# Patient Record
Sex: MALE | Race: OTHER | URBAN - METROPOLITAN AREA
[De-identification: names, ages, dates, MRNs, and addresses within clinical notes are randomized per-mention and may not be internally consistent; named-entity substitution may affect disease eponyms.]

---

## 2018-08-27 ENCOUNTER — INPATIENT (INPATIENT)
Facility: HOSPITAL | Age: 43
LOS: 3 days | Discharge: HOME | DRG: 274 | End: 2018-08-31
Attending: INTERNAL MEDICINE | Admitting: INTERNAL MEDICINE
Payer: COMMERCIAL

## 2018-08-27 VITALS
SYSTOLIC BLOOD PRESSURE: 134 MMHG | WEIGHT: 179.9 LBS | TEMPERATURE: 98 F | HEART RATE: 62 BPM | DIASTOLIC BLOOD PRESSURE: 85 MMHG | HEIGHT: 68 IN | OXYGEN SATURATION: 99 % | RESPIRATION RATE: 18 BRPM

## 2018-08-27 DIAGNOSIS — R00.2 PALPITATIONS: ICD-10-CM

## 2018-08-27 DIAGNOSIS — E78.5 HYPERLIPIDEMIA, UNSPECIFIED: ICD-10-CM

## 2018-08-27 LAB
ALBUMIN SERPL ELPH-MCNC: 5 G/DL — SIGNIFICANT CHANGE UP (ref 3.3–5)
ALP SERPL-CCNC: 61 U/L — SIGNIFICANT CHANGE UP (ref 40–120)
ALT FLD-CCNC: 29 U/L — SIGNIFICANT CHANGE UP (ref 10–45)
ANION GAP SERPL CALC-SCNC: 10 MMOL/L — SIGNIFICANT CHANGE UP (ref 5–17)
APTT BLD: 30 SEC — SIGNIFICANT CHANGE UP (ref 27.5–37.4)
AST SERPL-CCNC: 21 U/L — SIGNIFICANT CHANGE UP (ref 10–40)
BASOPHILS # BLD AUTO: 0.1 K/UL — SIGNIFICANT CHANGE UP (ref 0–0.2)
BASOPHILS NFR BLD AUTO: 1 % — SIGNIFICANT CHANGE UP (ref 0–2)
BILIRUB SERPL-MCNC: 0.8 MG/DL — SIGNIFICANT CHANGE UP (ref 0.2–1.2)
BUN SERPL-MCNC: 14 MG/DL — SIGNIFICANT CHANGE UP (ref 7–23)
CALCIUM SERPL-MCNC: 9.6 MG/DL — SIGNIFICANT CHANGE UP (ref 8.4–10.5)
CHLORIDE SERPL-SCNC: 98 MMOL/L — SIGNIFICANT CHANGE UP (ref 96–108)
CK MB CFR SERPL CALC: 1 NG/ML — SIGNIFICANT CHANGE UP (ref 0–6.7)
CK SERPL-CCNC: 87 U/L — SIGNIFICANT CHANGE UP (ref 30–200)
CK SERPL-CCNC: 91 U/L — SIGNIFICANT CHANGE UP (ref 30–200)
CO2 SERPL-SCNC: 27 MMOL/L — SIGNIFICANT CHANGE UP (ref 22–31)
CREAT SERPL-MCNC: 1.04 MG/DL — SIGNIFICANT CHANGE UP (ref 0.5–1.3)
EOSINOPHIL # BLD AUTO: 0.1 K/UL — SIGNIFICANT CHANGE UP (ref 0–0.5)
EOSINOPHIL NFR BLD AUTO: 1.9 % — SIGNIFICANT CHANGE UP (ref 0–6)
GLUCOSE SERPL-MCNC: 114 MG/DL — HIGH (ref 70–99)
HCT VFR BLD CALC: 50.7 % — HIGH (ref 39–50)
HGB BLD-MCNC: 17.1 G/DL — HIGH (ref 13–17)
INR BLD: 1.03 RATIO — SIGNIFICANT CHANGE UP (ref 0.88–1.16)
LYMPHOCYTES # BLD AUTO: 1.9 K/UL — SIGNIFICANT CHANGE UP (ref 1–3.3)
LYMPHOCYTES # BLD AUTO: 33.4 % — SIGNIFICANT CHANGE UP (ref 13–44)
MAGNESIUM SERPL-MCNC: 2.3 MG/DL — SIGNIFICANT CHANGE UP (ref 1.6–2.6)
MCHC RBC-ENTMCNC: 31.4 PG — SIGNIFICANT CHANGE UP (ref 27–34)
MCHC RBC-ENTMCNC: 33.8 GM/DL — SIGNIFICANT CHANGE UP (ref 32–36)
MCV RBC AUTO: 92.9 FL — SIGNIFICANT CHANGE UP (ref 80–100)
MONOCYTES # BLD AUTO: 0.4 K/UL — SIGNIFICANT CHANGE UP (ref 0–0.9)
MONOCYTES NFR BLD AUTO: 7.4 % — SIGNIFICANT CHANGE UP (ref 2–14)
NEUTROPHILS # BLD AUTO: 3.2 K/UL — SIGNIFICANT CHANGE UP (ref 1.8–7.4)
NEUTROPHILS NFR BLD AUTO: 56.4 % — SIGNIFICANT CHANGE UP (ref 43–77)
PHOSPHATE SERPL-MCNC: 2.2 MG/DL — LOW (ref 2.5–4.5)
PLATELET # BLD AUTO: 232 K/UL — SIGNIFICANT CHANGE UP (ref 150–400)
POTASSIUM SERPL-MCNC: 4.5 MMOL/L — SIGNIFICANT CHANGE UP (ref 3.5–5.3)
POTASSIUM SERPL-SCNC: 4.5 MMOL/L — SIGNIFICANT CHANGE UP (ref 3.5–5.3)
PROT SERPL-MCNC: 7.8 G/DL — SIGNIFICANT CHANGE UP (ref 6–8.3)
PROTHROM AB SERPL-ACNC: 11.1 SEC — SIGNIFICANT CHANGE UP (ref 9.8–12.7)
RBC # BLD: 5.46 M/UL — SIGNIFICANT CHANGE UP (ref 4.2–5.8)
RBC # FLD: 12.7 % — SIGNIFICANT CHANGE UP (ref 10.3–14.5)
SODIUM SERPL-SCNC: 135 MMOL/L — SIGNIFICANT CHANGE UP (ref 135–145)
TROPONIN T, HIGH SENSITIVITY RESULT: <6 NG/L — SIGNIFICANT CHANGE UP (ref 0–51)
WBC # BLD: 5.6 K/UL — SIGNIFICANT CHANGE UP (ref 3.8–10.5)
WBC # FLD AUTO: 5.6 K/UL — SIGNIFICANT CHANGE UP (ref 3.8–10.5)

## 2018-08-27 PROCEDURE — 99285 EMERGENCY DEPT VISIT HI MDM: CPT | Mod: 25

## 2018-08-27 PROCEDURE — 71046 X-RAY EXAM CHEST 2 VIEWS: CPT | Mod: 26

## 2018-08-27 PROCEDURE — 93010 ELECTROCARDIOGRAM REPORT: CPT | Mod: 59

## 2018-08-27 RX ORDER — TRIAMCINOLONE ACETONIDE 55 MCG
0 AEROSOL, SPRAY (GRAM) NASAL
Qty: 0 | Refills: 0 | COMMUNITY

## 2018-08-27 RX ORDER — ATORVASTATIN CALCIUM 80 MG/1
40 TABLET, FILM COATED ORAL AT BEDTIME
Qty: 0 | Refills: 0 | Status: DISCONTINUED | OUTPATIENT
Start: 2018-08-27 | End: 2018-08-31

## 2018-08-27 RX ORDER — SODIUM,POTASSIUM PHOSPHATES 278-250MG
1 POWDER IN PACKET (EA) ORAL
Qty: 0 | Refills: 0 | Status: COMPLETED | OUTPATIENT
Start: 2018-08-27 | End: 2018-08-28

## 2018-08-27 RX ORDER — MAGNESIUM SULFATE 500 MG/ML
1 VIAL (ML) INJECTION ONCE
Qty: 0 | Refills: 0 | Status: COMPLETED | OUTPATIENT
Start: 2018-08-27 | End: 2018-08-27

## 2018-08-27 RX ORDER — ENOXAPARIN SODIUM 100 MG/ML
40 INJECTION SUBCUTANEOUS EVERY 24 HOURS
Qty: 0 | Refills: 0 | Status: DISCONTINUED | OUTPATIENT
Start: 2018-08-27 | End: 2018-08-31

## 2018-08-27 RX ORDER — ONDANSETRON 8 MG/1
4 TABLET, FILM COATED ORAL EVERY 6 HOURS
Qty: 0 | Refills: 0 | Status: DISCONTINUED | OUTPATIENT
Start: 2018-08-27 | End: 2018-08-31

## 2018-08-27 RX ORDER — METOPROLOL TARTRATE 50 MG
25 TABLET ORAL DAILY
Qty: 0 | Refills: 0 | Status: DISCONTINUED | OUTPATIENT
Start: 2018-08-27 | End: 2018-08-31

## 2018-08-27 RX ADMIN — Medication 1 TABLET(S): at 22:12

## 2018-08-27 RX ADMIN — Medication 1 GRAM(S): at 13:30

## 2018-08-27 RX ADMIN — Medication 25 MILLIGRAM(S): at 22:12

## 2018-08-27 RX ADMIN — Medication 100 GRAM(S): at 12:30

## 2018-08-27 RX ADMIN — ATORVASTATIN CALCIUM 40 MILLIGRAM(S): 80 TABLET, FILM COATED ORAL at 22:12

## 2018-08-27 NOTE — H&P ADULT - NSHPLABSRESULTS_GEN_ALL_CORE
Lab Results:  CBC  CBC Full  -  ( 27 Aug 2018 11:41 )  WBC Count : 5.6 K/uL  Hemoglobin : 17.1 g/dL  Hematocrit : 50.7 %  Platelet Count - Automated : 232 K/uL  Mean Cell Volume : 92.9 fl  Mean Cell Hemoglobin : 31.4 pg  Mean Cell Hemoglobin Concentration : 33.8 gm/dL  Auto Neutrophil # : 3.2 K/uL  Auto Lymphocyte # : 1.9 K/uL  Auto Monocyte # : 0.4 K/uL  Auto Eosinophil # : 0.1 K/uL  Auto Basophil # : 0.1 K/uL  Auto Neutrophil % : 56.4 %  Auto Lymphocyte % : 33.4 %  Auto Monocyte % : 7.4 %  Auto Eosinophil % : 1.9 %  Auto Basophil % : 1.0 %    .		Differential:	[] Automated		[] Manual  Chemistry                        17.1   5.6   )-----------( 232      ( 27 Aug 2018 11:41 )             50.7     08-27    135  |  98  |  14  ----------------------------<  114<H>  4.5   |  27  |  1.04    Ca    9.6      27 Aug 2018 11:41  Phos  2.5     08-27  Mg     1.9     08-27    TPro  7.8  /  Alb  5.0  /  TBili  0.8  /  DBili  x   /  AST  21  /  ALT  29  /  AlkPhos  61  08-27    LIVER FUNCTIONS - ( 27 Aug 2018 11:41 )  Alb: 5.0 g/dL / Pro: 7.8 g/dL / ALK PHOS: 61 U/L / ALT: 29 U/L / AST: 21 U/L / GGT: x           PT/INR - ( 27 Aug 2018 11:41 )   PT: 11.1 sec;   INR: 1.03 ratio         PTT - ( 27 Aug 2018 11:41 )  PTT:30.0 sec          MICROBIOLOGY/CULTURES:      RADIOLOGY RESULTS: reviewed

## 2018-08-27 NOTE — CONSULT NOTE ADULT - SUBJECTIVE AND OBJECTIVE BOX
Patient seen and evaluated at bedside    Chief Complaint:    HPI:      PMH:   Hyperlipidemia, unspecified hyperlipidemia type      PSH:   No significant past surgical history      Medications:       Allergies:  No Known Allergies      FAMILY HISTORY:      Social History:  Smoking History:  Alcohol Use:  Drug Use:    Review of Systems:  REVIEW OF SYSTEMS:  CONSTITUTIONAL: No weakness, fevers or chills  EYES/ENT: No visual changes;  No dysphagia  NECK: No pain or stiffness  RESPIRATORY: No cough, wheezing, hemoptysis; No shortness of breath  CARDIOVASCULAR: No chest pain or palpitations; No lower extremity edema  GASTROINTESTINAL: No abdominal or epigastric pain. No nausea, vomiting, or hematemesis; No diarrhea or constipation. No melena or hematochezia.  BACK: No back pain  GENITOURINARY: No dysuria, frequency or hematuria  NEUROLOGICAL: No numbness or weakness  SKIN: No itching, burning, rashes, or lesions   All other review of systems is negative unless indicated above.    Physical Exam:  T(F): 98 (08-27), Max: 98 (08-27)  HR: 60 (08-27) (57 - 62)  BP: 126/82 (08-27) (121/87 - 134/85)  RR: 16 (08-27)  SpO2: 100% (08-27)      GENERAL: No acute distress, well-developed  HEAD:  Atraumatic, Normocephalic  ENT: EOMI, PERRLA, conjunctiva and sclera clear, Neck supple, No JVD, moist mucosa  CHEST/LUNG: Clear to auscultation bilaterally; No wheeze, equal breath sounds bilaterally   BACK: No spinal tenderness  HEART: Regular rate and rhythm; No murmurs, rubs, or gallops  ABDOMEN: Soft, Nontender, Nondistended; Bowel sounds present  EXTREMITIES:  No clubbing, cyanosis, or edema  PSYCH: Nl behavior, nl affect  NEUROLOGY: AAOx3, non-focal, cranial nerves intact  SKIN: Normal color, No rashes or lesions  LINES:    Cardiovascular Diagnostic Testing:    ECG: Personally reviewed    Echo:    Stress Testing:    Cath:    Interpretation of Telemetry:    Imaging:    Labs: Personally reviewed                        17.1   5.6   )-----------( 232      ( 27 Aug 2018 11:41 )             50.7     08-27    135  |  98  |  14  ----------------------------<  114<H>  4.5   |  27  |  1.04    Ca    9.6      27 Aug 2018 11:41  Phos  2.5     08-27  Mg     1.9     08-27    TPro  7.8  /  Alb  5.0  /  TBili  0.8  /  DBili  x   /  AST  21  /  ALT  29  /  AlkPhos  61  08-27    PT/INR - ( 27 Aug 2018 11:41 )   PT: 11.1 sec;   INR: 1.03 ratio         PTT - ( 27 Aug 2018 11:41 )  PTT:30.0 sec Chief Complaint: Chest Pain    HPI:  43 year old male with HLD presenting to ED with at recommendation of his cardiologist Dr. Ed Riley for NSVT seen on holter monitor. Approx. two weeks ago patient reports intermittent fluttering sensation in chest associated with left sided chest pain that radiates to the right and down to stomach. No SOB, nausea/vomiting/abdominal pain, lightheadedness dizziness. On Monday, patient saw his cardiologist who placed him on 24hr. holter monitor and was prescribed metoprolol 25mg daily. No symptoms at this current time.     Reports stressful job and increased stress at home due to pt's ishaan's mother moving in 3 months ago.       PMH:   Hyperlipidemia    PSH:   No significant past surgical history    Medications:       Allergies:  No Known Allergies      FAMILY HISTORY:      Social History:  Smoking History:  Alcohol Use:  Drug Use:    Review of Systems:  REVIEW OF SYSTEMS:  CONSTITUTIONAL: No weakness, fevers or chills  EYES/ENT: No visual changes;  No dysphagia  NECK: No pain or stiffness  RESPIRATORY: No cough, wheezing, hemoptysis; No shortness of breath  CARDIOVASCULAR: No chest pain or palpitations; No lower extremity edema  GASTROINTESTINAL: No abdominal or epigastric pain. No nausea, vomiting, or hematemesis; No diarrhea or constipation. No melena or hematochezia.  BACK: No back pain  GENITOURINARY: No dysuria, frequency or hematuria  NEUROLOGICAL: No numbness or weakness  SKIN: No itching, burning, rashes, or lesions   All other review of systems is negative unless indicated above.    Physical Exam:  T(F): 98 (08-27), Max: 98 (08-27)  HR: 60 (08-27) (57 - 62)  BP: 126/82 (08-27) (121/87 - 134/85)  RR: 16 (08-27)  SpO2: 100% (08-27)      GENERAL: No acute distress, well-developed  HEAD:  Atraumatic, Normocephalic  ENT: EOMI, PERRLA, conjunctiva and sclera clear, Neck supple, No JVD, moist mucosa  CHEST/LUNG: Clear to auscultation bilaterally; No wheeze, equal breath sounds bilaterally   BACK: No spinal tenderness  HEART: Regular rate and rhythm; No murmurs, rubs, or gallops  ABDOMEN: Soft, Nontender, Nondistended; Bowel sounds present  EXTREMITIES:  No clubbing, cyanosis, or edema  PSYCH: Nl behavior, nl affect  NEUROLOGY: AAOx3, non-focal, cranial nerves intact  SKIN: Normal color, No rashes or lesions  LINES:    Cardiovascular Diagnostic Testing:    ECG: Personally reviewed    Echo:    Stress Testing:    Cath:    Interpretation of Telemetry:    Imaging:    Labs: Personally reviewed                        17.1   5.6   )-----------( 232      ( 27 Aug 2018 11:41 )             50.7     08-27    135  |  98  |  14  ----------------------------<  114<H>  4.5   |  27  |  1.04    Ca    9.6      27 Aug 2018 11:41  Phos  2.5     08-27  Mg     1.9     08-27    TPro  7.8  /  Alb  5.0  /  TBili  0.8  /  DBili  x   /  AST  21  /  ALT  29  /  AlkPhos  61  08-27    PT/INR - ( 27 Aug 2018 11:41 )   PT: 11.1 sec;   INR: 1.03 ratio         PTT - ( 27 Aug 2018 11:41 )  PTT:30.0 sec Chief Complaint: Chest Pain    HPI:  43 year old male non-smoker with HLD presenting to ED with at recommendation of his cardiologist Dr. Ed Riley for NSVT seen on holter monitor. Approx. two weeks ago patient reports intermittent fluttering sensation in chest. No associated SOB, nausea/vomiting/abdominal pain, lightheadedness dizziness. On Monday, patient saw his cardiologist who placed him on 48hr. holter monitor and was prescribed metoprolol 25mg daily. Physician advised patient of abnormal heart rhythm and to go to ED for further evaluation. No symptoms at this current time. Last episode of fluttering sensation was yesterday. No personal or family hx of CAD, CHF, arrythmia.     PMH:   Hyperlipidemia    PSH:   No significant past surgical history    Medications:   Metoprolol 25mg XL      Allergies:  No Known Allergies    FAMILY HISTORY:  No hx of CAD or CHF or SCD.    Social History:  Smoking History: never    Review of Systems:  REVIEW OF SYSTEMS:  CONSTITUTIONAL: No weakness, fevers or chills  EYES/ENT: No visual changes;  No dysphagia  NECK: No pain or stiffness  RESPIRATORY: No cough, wheezing, hemoptysis; No shortness of breath  CARDIOVASCULAR: No chest pain or palpitations; No lower extremity edema  GASTROINTESTINAL: No abdominal or epigastric pain. No nausea, vomiting, or hematemesis; No diarrhea or constipation. No melena or hematochezia.  BACK: No back pain  GENITOURINARY: No dysuria, frequency or hematuria  NEUROLOGICAL: No numbness or weakness  SKIN: No itching, burning, rashes, or lesions   All other review of systems is negative unless indicated above.    Physical Exam:  T(F): 98 (08-27), Max: 98 (08-27)  HR: 60 (08-27) (57 - 62)  BP: 126/82 (08-27) (121/87 - 134/85)  RR: 16 (08-27)  SpO2: 100% (08-27)      GENERAL: No acute distress, well-developed  HEAD:  Atraumatic, Normocephalic  ENT: EOMI, PERRLA, conjunctiva and sclera clear, Neck supple, No JVD, moist mucosa  CHEST/LUNG: Clear to auscultation bilaterally; No wheeze, equal breath sounds bilaterally   BACK: No spinal tenderness  HEART: Regular rate and rhythm; No murmurs, rubs, or gallops  ABDOMEN: Soft, Nontender, Nondistended; Bowel sounds present  EXTREMITIES:  No clubbing, cyanosis, or edema  PSYCH: Nl behavior, nl affect  NEUROLOGY: AAOx3, non-focal, cranial nerves intact  SKIN: Normal color, No rashes or lesions      Cardiovascular Diagnostic Testing:    ECG: Personally reviewed    Echo:    Stress Testing:    Cath:    Interpretation of Telemetry:    Imaging:    Labs: Personally reviewed                        17.1   5.6   )-----------( 232      ( 27 Aug 2018 11:41 )             50.7     08-27    135  |  98  |  14  ----------------------------<  114<H>  4.5   |  27  |  1.04    Ca    9.6      27 Aug 2018 11:41  Phos  2.5     08-27  Mg     1.9     08-27    TPro  7.8  /  Alb  5.0  /  TBili  0.8  /  DBili  x   /  AST  21  /  ALT  29  /  AlkPhos  61  08-27    PT/INR - ( 27 Aug 2018 11:41 )   PT: 11.1 sec;   INR: 1.03 ratio         PTT - ( 27 Aug 2018 11:41 )  PTT:30.0 sec Chief Complaint: Chest Pain    HPI:  43 year old male non-smoker with HLD presenting to ED with at recommendation of his cardiologist Dr. Ed Riley for NSVT seen on holter monitor. Approx. two weeks ago patient reports intermittent fluttering sensation in chest. No associated SOB, nausea/vomiting/abdominal pain, lightheadedness dizziness. On Monday, patient saw his cardiologist who placed him on 48hr. holter monitor and was prescribed metoprolol 25mg daily. Physician advised patient of abnormal heart rhythm and to go to ED for further evaluation. No symptoms at this current time. Last episode of fluttering sensation was yesterday. No personal or family hx of CAD, CHF, arrythmia. Patient works-up as a , does not exercise, but denies symptoms of chest pain and dyspnea with ADLs.    PMH:   Hyperlipidemia    PSH:   No significant past surgical history    Medications:   Metoprolol 25mg XL      Allergies:  No Known Allergies    FAMILY HISTORY:  No hx of CAD or CHF or SCD.    Social History:  Smoking History: never    Review of Systems:  REVIEW OF SYSTEMS:  CONSTITUTIONAL: No weakness, fevers or chills  EYES/ENT: No visual changes;  No dysphagia  NECK: No pain or stiffness  RESPIRATORY: No cough, wheezing, hemoptysis; No shortness of breath  CARDIOVASCULAR: No chest pain or palpitations; No lower extremity edema  GASTROINTESTINAL: No abdominal or epigastric pain. No nausea, vomiting, or hematemesis; No diarrhea or constipation. No melena or hematochezia.  BACK: No back pain  GENITOURINARY: No dysuria, frequency or hematuria  NEUROLOGICAL: No numbness or weakness  SKIN: No itching, burning, rashes, or lesions   All other review of systems is negative unless indicated above.    Physical Exam:  T(F): 98 (08-27), Max: 98 (08-27)  HR: 60 (08-27) (57 - 62)  BP: 126/82 (08-27) (121/87 - 134/85)  RR: 16 (08-27)  SpO2: 100% (08-27)      GENERAL: No acute distress, well-developed  HEAD:  Atraumatic, Normocephalic  ENT: EOMI, PERRLA, conjunctiva and sclera clear, Neck supple, No JVD, moist mucosa  CHEST/LUNG: Clear to auscultation bilaterally; No wheeze, equal breath sounds bilaterally   BACK: No spinal tenderness  HEART: Regular rate and rhythm; No murmurs, rubs, or gallops  ABDOMEN: Soft, Nontender, Nondistended; Bowel sounds present  EXTREMITIES:  No clubbing, cyanosis, or edema  NEUROLOGY: AAOx3, non-focal, cranial nerves intact  SKIN: Normal color, No rashes or lesions      Cardiovascular Diagnostic Testing:    ECG: Personally reviewed  NSR, HR 62, normal axis, normal intervals, no ST changes, PVCs    Labs: Personally reviewed                        17.1   5.6   )-----------( 232      ( 27 Aug 2018 11:41 )             50.7     08-27    135  |  98  |  14  ----------------------------<  114<H>  4.5   |  27  |  1.04    Ca    9.6      27 Aug 2018 11:41  Phos  2.5     08-27  Mg     1.9     08-27    TPro  7.8  /  Alb  5.0  /  TBili  0.8  /  DBili  x   /  AST  21  /  ALT  29  /  AlkPhos  61  08-27    PT/INR - ( 27 Aug 2018 11:41 )   PT: 11.1 sec;   INR: 1.03 ratio         PTT - ( 27 Aug 2018 11:41 )  PTT:30.0 sec Chief Complaint: Chest Pain    HPI:  43 year old male non-smoker with HLD (diet-controlled) presenting to ED with at recommendation of his cardiologist Dr. Ed Riley for NSVT seen on holter monitor. Approx. two weeks ago patient reports intermittent fluttering sensation in chest. No associated SOB, nausea/vomiting/abdominal pain, lightheadedness dizziness. On Monday, patient saw his cardiologist who placed him on 48hr. holter monitor and was prescribed metoprolol 25mg daily. Physician advised patient of abnormal heart rhythm and to go to ED for further evaluation. No symptoms at this current time. Last episode of fluttering sensation was yesterday. No personal or family hx of CAD, CHF, arrythmia. Patient works-up as a , does not exercise, but denies symptoms of chest pain and dyspnea with ADLs.    PMH:   Hyperlipidemia    PSH:   No significant past surgical history    Medications:   Metoprolol 25mg XL      Allergies:  No Known Allergies    FAMILY HISTORY:  No hx of CAD or CHF or SCD.    Social History:  Smoking History: never    Review of Systems:  REVIEW OF SYSTEMS:  CONSTITUTIONAL: No weakness, fevers or chills  EYES/ENT: No visual changes;  No dysphagia  NECK: No pain or stiffness  RESPIRATORY: No cough, wheezing, hemoptysis; No shortness of breath  CARDIOVASCULAR: No chest pain or palpitations; No lower extremity edema  GASTROINTESTINAL: No abdominal or epigastric pain. No nausea, vomiting, or hematemesis; No diarrhea or constipation. No melena or hematochezia.  BACK: No back pain  GENITOURINARY: No dysuria, frequency or hematuria  NEUROLOGICAL: No numbness or weakness  SKIN: No itching, burning, rashes, or lesions   All other review of systems is negative unless indicated above.    Physical Exam:  T(F): 98 (08-27), Max: 98 (08-27)  HR: 60 (08-27) (57 - 62)  BP: 126/82 (08-27) (121/87 - 134/85)  RR: 16 (08-27)  SpO2: 100% (08-27)      GENERAL: No acute distress, well-developed  HEAD:  Atraumatic, Normocephalic  ENT: EOMI, PERRLA, conjunctiva and sclera clear, Neck supple, No JVD, moist mucosa  CHEST/LUNG: Clear to auscultation bilaterally; No wheeze, equal breath sounds bilaterally   BACK: No spinal tenderness  HEART: Regular rate and rhythm; No murmurs, rubs, or gallops  ABDOMEN: Soft, Nontender, Nondistended; Bowel sounds present  EXTREMITIES:  No clubbing, cyanosis, or edema  NEUROLOGY: AAOx3, non-focal, cranial nerves intact  SKIN: Normal color, No rashes or lesions      Cardiovascular Diagnostic Testing:    ECG: Personally reviewed  NSR, HR 62, normal axis, normal intervals, no ST changes, PVCs    Labs: Personally reviewed                        17.1   5.6   )-----------( 232      ( 27 Aug 2018 11:41 )             50.7     08-27    135  |  98  |  14  ----------------------------<  114<H>  4.5   |  27  |  1.04    Ca    9.6      27 Aug 2018 11:41  Phos  2.5     08-27  Mg     1.9     08-27    TPro  7.8  /  Alb  5.0  /  TBili  0.8  /  DBili  x   /  AST  21  /  ALT  29  /  AlkPhos  61  08-27    PT/INR - ( 27 Aug 2018 11:41 )   PT: 11.1 sec;   INR: 1.03 ratio         PTT - ( 27 Aug 2018 11:41 )  PTT:30.0 sec

## 2018-08-27 NOTE — H&P ADULT - ASSESSMENT
44 y/o M pt w/ PMH of HLD presenting to the ED w/ a complaint of chest pain. Pt sent in by his cardiologist Dr. Ed Riley. Pt stated that approximately 2 weeks ago he began to have a fluttering feeling in his chest that comes and goes. Reports increased coughing with the feeling, but denies shortness of breath, dizziness, nausea/vomiting. Saw cardiologist last Monday who placed him on 24 hr halter monitor. Was prescribed metoprolol 25 mg once a day on Friday which he has been taking. Was told this morning by cardiologist that he should come to the ED for further evaluation. Pt reports cardiologist told him his "heart was skipping a beat." Reports last feeling the fluttering yesterday and is currently symptom free. When he does get the feeling he says it starts on the L side of his chest and radiates to the right and down to his upper stomach. Reports stressful job and increased stress at home due to pt's fiance's mother moving in 3 months ago. No additional complaints at this time.

## 2018-08-27 NOTE — ED PROVIDER NOTE - PHYSICAL EXAMINATION
Gen: NAD, AOx3, able to make his needs known, non-toxic //            Head: NCAT //            HEENT: oral mucosa moist, normal conjunctiva //            Lung: CTAB, no respiratory distress, no wheezes/rhonchi/rales B/L, speaking in full sentences. //            CV: RRR, no murmurs or rubs //            Abd: soft, NTND, no guarding, no CVA tenderness //            MSK: no visible deformities, chest pain non reproducible on palpation //            Neuro: No focal sensory or motor deficits //            Skin: Warm, well perfused//            Psych: normal affect. Gen: NAD, AOx3, able to make his needs known, non-toxic //            Head: NCAT //            HEENT: oral mucosa moist, normal conjunctiva //            Lung: CTAB, no respiratory distress, no wheezes/rhonchi/rales B/L, speaking in full sentences. //            CV: RRR, no murmurs or rubs //            Abd: soft, NTND, no guarding, no CVA tenderness //            MSK: no visible deformities, chest pain non reproducible on palpation //            Neuro: No focal sensory or motor   deficits //            Skin: Warm, well perfused//            Psych: normal affect.  Attending Lyudmila Hu: Gen: NAD, heent: atrauamtic, eomi, perrla, mmm, op pink, uvula midline, neck; nttp, no nuchal rigidity, chest: nttp, no crepitus, cv: rrr, no murmurs, lungs: ctab, abd: soft, nontender, nondistended, no peritoneal signs, +BS, no guarding, ext: wwp, neg homans, skin: no rash, neuro: awake and alert, following commands, speech clear, sensation and strength intact, no focal deficits

## 2018-08-27 NOTE — ED PROVIDER NOTE - MEDICAL DECISION MAKING DETAILS
44 y/o M w/ chest pain. DDx arrythmia vs. PVCs vs. doubtful ACS vs. MSK. Labs, trops, CXR, cardio, reassess. 44 y/o M w/ chest pain. DDx arrythmia vs. PVCs vs. doubtful ACS vs. MSK. Labs, trops, CXR, cardio, reassess.  Attending Lyudmila Hu: 44 y/o male sent in by cardiologist for concern for arrythmia on holter monitor. pt reports having intermittent palpitations. currently asymptomatic. no h/o cardiac disease. dw pt's cardiologist, had episodes of nonsustained vtch. will need admission for EP work up. pt on tele

## 2018-08-27 NOTE — CONSULT NOTE ADULT - ASSESSMENT
Rod Cabrera MD  Cardiology Fellow [id number]  Please call [phone] if after 5pm 43 year old male non-smoker with HLD presenting to ED with at recommendation of his cardiologist Dr. Ed Riley for NSVT seen on holter.    NSVT  -currently asymptomatic, no associated chest pain, SOB, lightheadedness, dizziness with events  -EKG: NSR, HR 62, normal axis, normal intervals, no ST changes, PVCs  -check ECHO  -check TSH      Rod Cabrera MD  Cardiology Fellow [id number]  Please call [phone] if after 5pm 43 year old male non-smoker with HLD presenting to ED with at recommendation of his cardiologist Dr. Ed Riley for NSVT seen on holter.    NSVT  -currently asymptomatic, no associated chest pain, SOB, lightheadedness, dizziness with events  -EKG: NSR, HR 62, normal axis, normal intervals, no ST changes, PVCs  -check ECHO to evaluate for structural abnormalities  -check TSH  -continue telemetry  -stress test vs. cath will be needed for ischemic evaluation  -c/w with metroprol 25mg ER       Rod Cabrera MD  Cardiology Fellow [id number]  Please call [phone] if after 5pm 43 year old male non-smoker with HLD presenting to ED with at recommendation of his cardiologist Dr. Ed Riley for NSVT seen on holter.    NSVT  -currently asymptomatic, no associated chest pain, SOB, lightheadedness, dizziness with events  -EKG: NSR, HR 62, normal axis, normal intervals, no ST changes, PVCs  -check ECHO to evaluate for structural abnormalities  -check TSH, A1c, and Lipids  -keep K>4 and Mg >2  -continue telemetry  -stress test vs. cath will be needed for ischemic evaluation  -c/w with metroprol 25mg ER     **Attending attestation to follow.    Rod Cabrera MD  Cardiology Fellow [id number]  Please call [phone] if after 5pm 43 year old male non-smoker with HLD presenting to ED with at recommendation of his cardiologist Dr. Ed Riley for NSVT seen on holter.    NSVT  -currently asymptomatic, no associated chest pain, SOB, lightheadedness, dizziness with events  -EKG: NSR, HR 62, normal axis, normal intervals, no ST changes, PVCs  -check ECHO to evaluate for structural abnormalities  -if ECHO abnormal, will need LHC, if normal, will need stress test for ischemic eval  -check TSH, A1c, and Lipids  -keep K>4 and Mg >2  -continue telemetry  -c/w with metoprolol 25mg ER     **Attending attestation to follow.      Rod Cabrera MD  Cardiology Fellow [id number]  Please call [phone] if after 5pm 43 year old male non-smoker with HLD presenting to ED with at recommendation of his cardiologist Dr. Ed Riley for NSVT seen on holter.    NSVT  -currently asymptomatic, no associated chest pain, SOB, lightheadedness, dizziness with events  -EKG: NSR, HR 62, normal axis, normal intervals, no ST changes, PVCs  -check ECHO to evaluate for structural abnormalities  -if ECHO abnormal, will need LHC, if normal, will need stress test for ischemic eval  -check TSH, A1c, and Lipids  -keep K>4 and Mg >2  -continue telemetry  -c/w with metoprolol 25mg ER  -obtain 12-lead EKG in AM      Rod Cabrera MD  Cardiology Fellow [id number]  Please call [phone] if after 5pm

## 2018-08-27 NOTE — ED ADULT NURSE NOTE - NSIMPLEMENTINTERV_GEN_ALL_ED
Implemented All Universal Safety Interventions:  [place] to call system. Call bell, personal items and telephone within reach. Instruct patient to call for assistance. Room bathroom lighting operational. Non-slip footwear when patient is off stretcher. Physically safe environment: no spills, clutter or unnecessary equipment. Stretcher in lowest position, wheels locked, appropriate side rails in place.

## 2018-08-27 NOTE — H&P ADULT - NSHPPHYSICALEXAM_GEN_ALL_CORE
General: WN/WD NAD  PERRLA  Neurology: A&Ox3, nonfocal, LLAMAS x 4  Respiratory: CTA B/L  CV: RRR, S1S2, no murmurs, rubs or gallops  Abdominal: Soft, NT, ND +BS, Last BM  Extremities: No edema, + peripheral pulses  Skin Normal

## 2018-08-27 NOTE — ED ADULT NURSE NOTE - OBJECTIVE STATEMENT
1114 43 yr old male sent to ER by cardiologist for further eval and tx of arrhythmia. Recent hx of chest pain, palp and dizziness . No chest pain., palp or dizziness at present. Wore holter monitor outpatient for 2 wks and arrhythmia was detected.A&Ox4. color pink. skin W&D. Lungs clear. abd soft. EKG was done in ER. Cardiac monitor applied by Ed tech

## 2018-08-27 NOTE — ED PROVIDER NOTE - PROGRESS NOTE DETAILS
Yulissa: Sent page to Dr. Riley Yulissa: Unable to get in touch w/ Dr. Riley but was able to contact Dr. Fuentes who is interventional cardiologist that spoke to Dr. Riley. Dr. Fuentes reports pt having runs of sustained vtach on halter monitor. To be admitted for further workup.

## 2018-08-27 NOTE — ED ADULT NURSE NOTE - CAS EDP DISCH DISPOSITION ADMI
I have personally seen and examined this patient.  I have fully participated in the care of this patient. I have reviewed all pertinent clinical information, including history, physical exam, plan and the Resident’s note and agree except as noted. Telemetry/hoplding

## 2018-08-27 NOTE — ED PROVIDER NOTE - NS ED ROS FT
GENERAL: No fever or chills, //             EYES: no change in vision, //             HEENT: no trouble swallowing or speaking, //             CARDIAC: no chest pain, //              PULMONARY: no cough or SOB, //             GI: no abdominal pain, no nausea or no vomiting, no diarrhea or constipation, //             : No changes in urination,  //            SKIN: no rashes,  //            NEURO: no headache,  //             MSK: No joint pain otherwise as HPI or negative.

## 2018-08-28 LAB
ALBUMIN SERPL ELPH-MCNC: 4.4 G/DL — SIGNIFICANT CHANGE UP (ref 3.3–5)
ALP SERPL-CCNC: 56 U/L — SIGNIFICANT CHANGE UP (ref 40–120)
ALT FLD-CCNC: 29 U/L — SIGNIFICANT CHANGE UP (ref 10–45)
ANION GAP SERPL CALC-SCNC: 12 MMOL/L — SIGNIFICANT CHANGE UP (ref 5–17)
AST SERPL-CCNC: 21 U/L — SIGNIFICANT CHANGE UP (ref 10–40)
BILIRUB SERPL-MCNC: 0.6 MG/DL — SIGNIFICANT CHANGE UP (ref 0.2–1.2)
BUN SERPL-MCNC: 15 MG/DL — SIGNIFICANT CHANGE UP (ref 7–23)
CALCIUM SERPL-MCNC: 9.1 MG/DL — SIGNIFICANT CHANGE UP (ref 8.4–10.5)
CHLORIDE SERPL-SCNC: 103 MMOL/L — SIGNIFICANT CHANGE UP (ref 96–108)
CHOLEST SERPL-MCNC: 261 MG/DL — HIGH (ref 10–199)
CK SERPL-CCNC: 80 U/L — SIGNIFICANT CHANGE UP (ref 30–200)
CO2 SERPL-SCNC: 24 MMOL/L — SIGNIFICANT CHANGE UP (ref 22–31)
CREAT SERPL-MCNC: 0.98 MG/DL — SIGNIFICANT CHANGE UP (ref 0.5–1.3)
GLUCOSE SERPL-MCNC: 124 MG/DL — HIGH (ref 70–99)
HBA1C BLD-MCNC: 5.5 % — SIGNIFICANT CHANGE UP (ref 4–5.6)
HCT VFR BLD CALC: 47.7 % — SIGNIFICANT CHANGE UP (ref 39–50)
HDLC SERPL-MCNC: 33 MG/DL — LOW
HGB BLD-MCNC: 16.6 G/DL — SIGNIFICANT CHANGE UP (ref 13–17)
LIPID PNL WITH DIRECT LDL SERPL: SIGNIFICANT CHANGE UP
MAGNESIUM SERPL-MCNC: 2.1 MG/DL — SIGNIFICANT CHANGE UP (ref 1.6–2.6)
MCHC RBC-ENTMCNC: 32.2 PG — SIGNIFICANT CHANGE UP (ref 27–34)
MCHC RBC-ENTMCNC: 34.9 GM/DL — SIGNIFICANT CHANGE UP (ref 32–36)
MCV RBC AUTO: 92.2 FL — SIGNIFICANT CHANGE UP (ref 80–100)
PHOSPHATE SERPL-MCNC: 3.9 MG/DL — SIGNIFICANT CHANGE UP (ref 2.5–4.5)
PLATELET # BLD AUTO: 213 K/UL — SIGNIFICANT CHANGE UP (ref 150–400)
POTASSIUM SERPL-MCNC: 3.8 MMOL/L — SIGNIFICANT CHANGE UP (ref 3.5–5.3)
POTASSIUM SERPL-SCNC: 3.8 MMOL/L — SIGNIFICANT CHANGE UP (ref 3.5–5.3)
PROT SERPL-MCNC: 6.8 G/DL — SIGNIFICANT CHANGE UP (ref 6–8.3)
RBC # BLD: 5.17 M/UL — SIGNIFICANT CHANGE UP (ref 4.2–5.8)
RBC # FLD: 12.7 % — SIGNIFICANT CHANGE UP (ref 10.3–14.5)
SODIUM SERPL-SCNC: 139 MMOL/L — SIGNIFICANT CHANGE UP (ref 135–145)
TOTAL CHOLESTEROL/HDL RATIO MEASUREMENT: 7.9 RATIO — SIGNIFICANT CHANGE UP (ref 3.4–9.6)
TRIGL SERPL-MCNC: 414 MG/DL — HIGH (ref 10–149)
TSH SERPL-MCNC: 1.45 UIU/ML — SIGNIFICANT CHANGE UP (ref 0.27–4.2)
WBC # BLD: 5.6 K/UL — SIGNIFICANT CHANGE UP (ref 3.8–10.5)
WBC # FLD AUTO: 5.6 K/UL — SIGNIFICANT CHANGE UP (ref 3.8–10.5)

## 2018-08-28 PROCEDURE — 93306 TTE W/DOPPLER COMPLETE: CPT | Mod: 26

## 2018-08-28 PROCEDURE — 93010 ELECTROCARDIOGRAM REPORT: CPT

## 2018-08-28 RX ADMIN — ATORVASTATIN CALCIUM 40 MILLIGRAM(S): 80 TABLET, FILM COATED ORAL at 21:15

## 2018-08-28 RX ADMIN — Medication 1 TABLET(S): at 17:11

## 2018-08-28 RX ADMIN — Medication 25 MILLIGRAM(S): at 21:16

## 2018-08-28 NOTE — PROGRESS NOTE ADULT - SUBJECTIVE AND OBJECTIVE BOX
Patient seen and examined at bedside.    Overnight Events:  No acute events.    Tele: NSR, 50-70s, no events, no VT    Review Of Systems: No chest pain, shortness of breath, or palpitations            Medications:  atorvastatin 40 milliGRAM(s) Oral at bedtime  enoxaparin Injectable 40 milliGRAM(s) SubCutaneous every 24 hours  metoprolol succinate ER 25 milliGRAM(s) Oral daily  ondansetron Injectable 4 milliGRAM(s) IV Push every 6 hours PRN  potassium acid phosphate/sodium acid phosphate tablet (K-PHOS No. 2) 1 Tablet(s) Oral two times a day with meals      PAST MEDICAL & SURGICAL HISTORY:  Hyperlipidemia, unspecified hyperlipidemia type  No significant past surgical history      Vitals:  T(F): 97.9 (08-28), Max: 98.6 (08-27)  HR: 75 (08-28) (57 - 75)  BP: 105/72 (08-28) (105/72 - 134/85)  RR: 18 (08-28)  SpO2: 98% (08-28)  I&O's Summary    27 Aug 2018 07:01  -  28 Aug 2018 07:00  --------------------------------------------------------  IN: 120 mL / OUT: 200 mL / NET: -80 mL    28 Aug 2018 07:01  -  28 Aug 2018 10:43  --------------------------------------------------------  IN: 240 mL / OUT: 0 mL / NET: 240 mL        Physical Exam:  Appearance: No acute distress; well appearing  Eyes: PERRL, EOMI, pink conjunctiva  HENT: Normal oral muscosa  Cardiovascular: RRR, S1, S2, no murmurs, rubs, or gallops; no edema; no JVD  Respiratory: Clear to auscultation bilaterally  Gastrointestinal: soft, non-tender, non-distended with normal bowel sounds  Musculoskeletal: No clubbing; no joint deformity   Neurologic: Non-focal  Lymphatic: No lymphadenopathy  Psychiatry: AAOx3, mood & affect appropriate  Skin: No rashes, ecchymoses, or cyanosis                          16.6   5.6   )-----------( 213      ( 28 Aug 2018 05:40 )             47.7     08-28    139  |  103  |  15  ----------------------------<  124<H>  3.8   |  24  |  0.98    Ca    9.1      28 Aug 2018 05:40  Phos  3.9     08-28  Mg     2.1     08-28    TPro  6.8  /  Alb  4.4  /  TBili  0.6  /  DBili  x   /  AST  21  /  ALT  29  /  AlkPhos  56  08-28    PT/INR - ( 27 Aug 2018 11:41 )   PT: 11.1 sec;   INR: 1.03 ratio         PTT - ( 27 Aug 2018 11:41 )  PTT:30.0 sec  CARDIAC MARKERS ( 28 Aug 2018 05:40 )  x     / x     / 80 U/L / x     / x      CARDIAC MARKERS ( 27 Aug 2018 22:27 )  x     / x     / 91 U/L / x     / 1.0 ng/mL  CARDIAC MARKERS ( 27 Aug 2018 15:24 )  x     / x     / 87 U/L / x     / x

## 2018-08-28 NOTE — PROGRESS NOTE ADULT - ASSESSMENT
Problem/Plan - 1:  ·  Problem: Palpitations.  Plan: telemonitor   ro ACS  EP fu  management as per cards.   check stress test     Problem/Plan - 2:  ·  Problem: Hyperlipidemia, unspecified hyperlipidemia type.  Plan: cw statin.     will monitor

## 2018-08-28 NOTE — PROGRESS NOTE ADULT - SUBJECTIVE AND OBJECTIVE BOX
Patient is a 43y old  Male who presents with a chief complaint of chest pain (27 Aug 2018 14:37)      INTERVAL HPI/OVERNIGHT EVENTS:  T(C): 36.7 (08-28-18 @ 13:12), Max: 37 (08-27-18 @ 19:46)  HR: 59 (08-28-18 @ 13:12) (59 - 75)  BP: 118/85 (08-28-18 @ 13:12) (105/72 - 127/86)  RR: 18 (08-28-18 @ 13:12) (18 - 18)  SpO2: 98% (08-28-18 @ 13:12) (98% - 100%)  Wt(kg): --  I&O's Summary    27 Aug 2018 07:01  -  28 Aug 2018 07:00  --------------------------------------------------------  IN: 120 mL / OUT: 200 mL / NET: -80 mL    28 Aug 2018 07:01  -  28 Aug 2018 18:26  --------------------------------------------------------  IN: 560 mL / OUT: 1650 mL / NET: -1090 mL        LABS:                        16.6   5.6   )-----------( 213      ( 28 Aug 2018 05:40 )             47.7     08-28    139  |  103  |  15  ----------------------------<  124<H>  3.8   |  24  |  0.98    Ca    9.1      28 Aug 2018 05:40  Phos  3.9     08-28  Mg     2.1     08-28    TPro  6.8  /  Alb  4.4  /  TBili  0.6  /  DBili  x   /  AST  21  /  ALT  29  /  AlkPhos  56  08-28    PT/INR - ( 27 Aug 2018 11:41 )   PT: 11.1 sec;   INR: 1.03 ratio         PTT - ( 27 Aug 2018 11:41 )  PTT:30.0 sec    CAPILLARY BLOOD GLUCOSE                MEDICATIONS  (STANDING):  atorvastatin 40 milliGRAM(s) Oral at bedtime  enoxaparin Injectable 40 milliGRAM(s) SubCutaneous every 24 hours  metoprolol succinate ER 25 milliGRAM(s) Oral daily    MEDICATIONS  (PRN):  ondansetron Injectable 4 milliGRAM(s) IV Push every 6 hours PRN Nausea          PHYSICAL EXAM:  GENERAL: NAD, well-groomed, well-developed  HEAD:  Atraumatic, Normocephalic  CHEST/LUNG: Clear to percussion bilaterally; No rales, rhonchi, wheezing, or rubs  HEART: Regular rate and rhythm; No murmurs, rubs, or gallops  ABDOMEN: Soft, Nontender, Nondistended; Bowel sounds present  EXTREMITIES:  2+ Peripheral Pulses, No clubbing, cyanosis, or edema  LYMPH: No lymphadenopathy noted  SKIN: No rashes or lesions    Care Discussed with Consultants/Other Providers [ ] YES  [ ] NO

## 2018-08-28 NOTE — PROGRESS NOTE ADULT - ASSESSMENT
43 year old male non-smoker with HLD presenting to ED with at recommendation of his cardiologist Dr. Ed Riley for NSVT seen on holter.    NSVT  -currently asymptomatic, no associated chest pain, SOB, lightheadedness, dizziness with events  -EKG:  -Holter: Vtvery irregualr C/W automatic focu  -TSH normal  -check ECHO to rule out structural abnormalities  -check Nuclear Stress to rule out ischemia  -if Echo or Stress are abnormal will need [facility]  -keep K>4 and Mg >2  -continue telemetry  -c/w with metoprolol 25mg ER daily    VT very irregualr C/W automatic focus. PVC on ECG C/W RVOT or cusp origin      -EKG: NSR, HR 62, normal axis, normal intervals, no ST changes, PVCs    Rod Cabrera MD  Cardiology Fellow [id number]  Please call [phone] if after 5pm 43 year old male non-smoker with HLD presenting to ED with at recommendation of his cardiologist Dr. Ed Riley for NSVT seen on holter.    NSVT  -currently asymptomatic, no associated chest pain, SOB, lightheadedness, dizziness  -Holter interpretation: VT very irregular suggestive of automatic focus  -EKG: NSR, HR 62, normal axis, normal intervals, no ST changes, PVC c/w RVOT or cusp origin  -TSH normal  -check ECHO to rule out structural abnormalities  -check Nuclear Stress to rule out ischemia  -if Echo or Stress are abnormal will need C  -keep K>4 and Mg >2  -continue telemetry  -c/w with metoprolol 25mg ER daily    Rod Cabrera MD  Cardiology Fellow [id number]  Please call [phone] if after 5pm

## 2018-08-29 LAB
ANION GAP SERPL CALC-SCNC: 12 MMOL/L — SIGNIFICANT CHANGE UP (ref 5–17)
BUN SERPL-MCNC: 16 MG/DL — SIGNIFICANT CHANGE UP (ref 7–23)
CALCIUM SERPL-MCNC: 9.1 MG/DL — SIGNIFICANT CHANGE UP (ref 8.4–10.5)
CHLORIDE SERPL-SCNC: 104 MMOL/L — SIGNIFICANT CHANGE UP (ref 96–108)
CO2 SERPL-SCNC: 23 MMOL/L — SIGNIFICANT CHANGE UP (ref 22–31)
CREAT SERPL-MCNC: 1.02 MG/DL — SIGNIFICANT CHANGE UP (ref 0.5–1.3)
GLUCOSE SERPL-MCNC: 114 MG/DL — HIGH (ref 70–99)
HCT VFR BLD CALC: 49.6 % — SIGNIFICANT CHANGE UP (ref 39–50)
HGB BLD-MCNC: 17 G/DL — SIGNIFICANT CHANGE UP (ref 13–17)
MCHC RBC-ENTMCNC: 31.4 PG — SIGNIFICANT CHANGE UP (ref 27–34)
MCHC RBC-ENTMCNC: 34.2 GM/DL — SIGNIFICANT CHANGE UP (ref 32–36)
MCV RBC AUTO: 92 FL — SIGNIFICANT CHANGE UP (ref 80–100)
PLATELET # BLD AUTO: 196 K/UL — SIGNIFICANT CHANGE UP (ref 150–400)
POTASSIUM SERPL-MCNC: 4.1 MMOL/L — SIGNIFICANT CHANGE UP (ref 3.5–5.3)
POTASSIUM SERPL-SCNC: 4.1 MMOL/L — SIGNIFICANT CHANGE UP (ref 3.5–5.3)
RBC # BLD: 5.4 M/UL — SIGNIFICANT CHANGE UP (ref 4.2–5.8)
RBC # FLD: 12.5 % — SIGNIFICANT CHANGE UP (ref 10.3–14.5)
SODIUM SERPL-SCNC: 139 MMOL/L — SIGNIFICANT CHANGE UP (ref 135–145)
WBC # BLD: 6.7 K/UL — SIGNIFICANT CHANGE UP (ref 3.8–10.5)
WBC # FLD AUTO: 6.7 K/UL — SIGNIFICANT CHANGE UP (ref 3.8–10.5)

## 2018-08-29 PROCEDURE — 93016 CV STRESS TEST SUPVJ ONLY: CPT

## 2018-08-29 PROCEDURE — 78452 HT MUSCLE IMAGE SPECT MULT: CPT | Mod: 26

## 2018-08-29 PROCEDURE — 93018 CV STRESS TEST I&R ONLY: CPT

## 2018-08-29 RX ADMIN — ATORVASTATIN CALCIUM 40 MILLIGRAM(S): 80 TABLET, FILM COATED ORAL at 21:39

## 2018-08-29 RX ADMIN — Medication 25 MILLIGRAM(S): at 21:27

## 2018-08-29 NOTE — PROGRESS NOTE ADULT - ASSESSMENT
43 year old male non-smoker with HLD presenting to ED with at recommendation of his cardiologist Dr. Ed Riley for NSVT seen on holter.    Idiopathic VT  -remains asymptomatic, no associated chest pain, SOB, lightheadedness, dizziness  -Holter interpretation: VT very irregular suggestive of automatic focus  -EKG: NSR, HR 62, normal axis, normal intervals, no ST changes, PVC c/w RVOT or cusp origin  -TSH normal  -Echo without structural abnormalities  -Nuclear stress pending to rule out ischemia, if positive will need [facility]  -keep K>4 and Mg >2  -continue telemetry  -c/w with metoprolol 25mg ER daily    Rod Cabrera MD  Cardiology Fellow [id number]  Please call [phone] if after 5pm

## 2018-08-29 NOTE — PROGRESS NOTE ADULT - ASSESSMENT
Problem/Plan - 1:  ·  Problem: Palpitations.  Plan: telemonitor   ro ACS  EP fu  management as per cards.   check stress test results  may need cath    Problem/Plan - 2:  ·  Problem: Hyperlipidemia, unspecified hyperlipidemia type.  Plan: cw statin.     will monitor

## 2018-08-29 NOTE — PROGRESS NOTE ADULT - SUBJECTIVE AND OBJECTIVE BOX
Patient seen and examined at bedside.    Overnight Events:     Review Of Systems: No chest pain, shortness of breath, or palpitations            Medications:  atorvastatin 40 milliGRAM(s) Oral at bedtime  enoxaparin Injectable 40 milliGRAM(s) SubCutaneous every 24 hours  metoprolol succinate ER 25 milliGRAM(s) Oral daily  ondansetron Injectable 4 milliGRAM(s) IV Push every 6 hours PRN      PAST MEDICAL & SURGICAL HISTORY:  Hyperlipidemia, unspecified hyperlipidemia type  No significant past surgical history      Vitals:  T(F): 97.6 (), Max: 98.3 ()  HR: 59 () (59 - 76)  BP: 101/63 () (101/63 - 121/77)  RR: 17 ()  SpO2: 99% ()  I&O's Summary    28 Aug 2018 07:01  -  29 Aug 2018 07:00  --------------------------------------------------------  IN: 740 mL / OUT: 1650 mL / NET: -910 mL        Physical Exam:  Appearance: No acute distress; well appearing  Eyes: PERRL, EOMI, pink conjunctiva  HENT: Normal oral muscosa  Cardiovascular: RRR, S1, S2, no murmurs, rubs, or gallops; no edema; no JVD  Respiratory: Clear to auscultation bilaterally  Gastrointestinal: soft, non-tender, non-distended with normal bowel sounds  Musculoskeletal: No clubbing; no joint deformity   Neurologic: Non-focal  Lymphatic: No lymphadenopathy  Psychiatry: AAOx3, mood & affect appropriate  Skin: No rashes, ecchymoses, or cyanosis                          17.0   6.7   )-----------( 196      ( 29 Aug 2018 04:13 )             49.6         139  |  104  |  16  ----------------------------<  114<H>  4.1   |  23  |  1.02    Ca    9.1      29 Aug 2018 04:13  Phos  3.9       Mg     2.1         TPro  6.8  /  Alb  4.4  /  TBili  0.6  /  DBili  x   /  AST  21  /  ALT  29  /  AlkPhos  56      PT/INR - ( 27 Aug 2018 11:41 )   PT: 11.1 sec;   INR: 1.03 ratio         PTT - ( 27 Aug 2018 11:41 )  PTT:30.0 sec  CARDIAC MARKERS ( 28 Aug 2018 05:40 )  x     / x     / 80 U/L / x     / x      CARDIAC MARKERS ( 27 Aug 2018 22:27 )  x     / x     / 91 U/L / x     / 1.0 ng/mL  CARDIAC MARKERS ( 27 Aug 2018 15:24 )  x     / x     / 87 U/L / x     / x            Total Cholesterol: 261  LDL: Unable to calculate LDL Cholesterol --- Interpretive Comment (for adults 18 and over)  Optimal LDL Level may vary based on clinical situation  Below 70                  Ideal for people at very high risk of heart  disease  Below 100                Ideal for people at risk of heart disease  100 - 129                   [place]  130 - 159                   Borderline high  160 - 189                   High  190 and Above          Very high  HDL: 33  T    Echo:    Interpretation of Telemetry: Patient seen and examined at bedside.    Overnight Events: No acute events. Reports multiple intermittent episodes of fluttering sensation. No associated chest pain, shortness of breath, or palpitations.     Telemetry: NSR 60-70s, PVCs, triplets    Review Of Systems: No chest pain, shortness of breath, or palpitations            Medications:  atorvastatin 40 milliGRAM(s) Oral at bedtime  enoxaparin Injectable 40 milliGRAM(s) SubCutaneous every 24 hours  metoprolol succinate ER 25 milliGRAM(s) Oral daily  ondansetron Injectable 4 milliGRAM(s) IV Push every 6 hours PRN      PAST MEDICAL & SURGICAL HISTORY:  Hyperlipidemia, unspecified hyperlipidemia type  No significant past surgical history      Vitals:  T(F): 97.6 (), Max: 98.3 ()  HR: 59 () (59 - 76)  BP: 101/63 () (101/63 - 121/77)  RR: 17 ()  SpO2: 99% ()  I&O's Summary    28 Aug 2018 07:01  -  29 Aug 2018 07:00  --------------------------------------------------------  IN: 740 mL / OUT: 1650 mL / NET: -910 mL        Physical Exam:  Appearance: No acute distress; well appearing  Eyes: PERRL, EOMI, pink conjunctiva  HENT: Normal oral muscosa  Cardiovascular: RRR, S1, S2, no murmurs, rubs, or gallops; no edema; no JVD  Respiratory: Clear to auscultation bilaterally  Gastrointestinal: soft, non-tender, non-distended with normal bowel sounds  Musculoskeletal: No clubbing; no joint deformity   Neurologic: Non-focal  Lymphatic: No lymphadenopathy  Skin: No rashes                          17.0   6.7   )-----------( 196      ( 29 Aug 2018 04:13 )             49.6         139  |  104  |  16  ----------------------------<  114<H>  4.1   |  23  |  1.02    Ca    9.1      29 Aug 2018 04:13  Phos  3.9       Mg     2.1         TPro  6.8  /  Alb  4.4  /  TBili  0.6  /  DBili  x   /  AST  21  /  ALT  29  /  AlkPhos  56      PT/INR - ( 27 Aug 2018 11:41 )   PT: 11.1 sec;   INR: 1.03 ratio         PTT - ( 27 Aug 2018 11:41 )  PTT:30.0 sec  CARDIAC MARKERS ( 28 Aug 2018 05:40 )  x     / x     / 80 U/L / x     / x      CARDIAC MARKERS ( 27 Aug 2018 22:27 )  x     / x     / 91 U/L / x     / 1.0 ng/mL  CARDIAC MARKERS ( 27 Aug 2018 15:24 )  x     / x     / 87 U/L / x     / x            Total Cholesterol: 261  LDL: Unable to calculate LDL Cholesterol --- Interpretive Comment (for adults 18 and over)  Optimal LDL Level may vary based on clinical situation  Below 70                  Ideal for people at very high risk of heart  disease  Below 100                Ideal for people at risk of heart disease  100 - 129                   [place]  130 - 159                   Borderline high  160 - 189                   High  190 and Above          Very high  HDL: 33  T    Echo:  :  1. Normal left ventricular internal dimensions and wall  thicknesses.  2. Normal left ventricular systolic function. No segmental  wall motion abnormalities.  3. Normal right ventricular size and function.  4. Normal tricuspid valve. Mild tricuspid regurgitation.  5. Estimated pulmonary artery systolic pressure equals 26  mm Hg, assuming right atrial pressure equals 8  mm Hg,  consistent with normal pulmonary pressures.

## 2018-08-29 NOTE — PATIENT PROFILE ADULT. - CENTRAL VENOUS CATHETER
This note was copied from a baby's chart. I was asked to see this mother first thing this morning. Mother's first baby had to be admitted for ?dehydration. Mother is understandably very anxious. This infant has not eaten for about 4 hours. Infant is very sleepy and refuses to latch but is doing some licking. Mother has good colostrum and was able to express about 15 gtts and give to infant off her finger. Mother was reassured that that is good for this stage of life. Infant was swaddled and resettled in crib supine for mother to eat. Mother has my name and was told how to reach me. [id number] Mom called for help to get infant to latch but he is in a deep sleep and will not stay awake despite measures taken to wake him up. Mom expressed some gtts and gave off her finger. She will do skin to skin now. no

## 2018-08-30 ENCOUNTER — TRANSCRIPTION ENCOUNTER (OUTPATIENT)
Age: 43
End: 2018-08-30

## 2018-08-30 PROBLEM — E78.5 HYPERLIPIDEMIA, UNSPECIFIED: Chronic | Status: ACTIVE | Noted: 2018-08-27

## 2018-08-30 LAB
ANION GAP SERPL CALC-SCNC: 14 MMOL/L — SIGNIFICANT CHANGE UP (ref 5–17)
APTT BLD: 29.7 SEC — SIGNIFICANT CHANGE UP (ref 27.5–37.4)
BLD GP AB SCN SERPL QL: NEGATIVE — SIGNIFICANT CHANGE UP
BUN SERPL-MCNC: 18 MG/DL — SIGNIFICANT CHANGE UP (ref 7–23)
CALCIUM SERPL-MCNC: 9.1 MG/DL — SIGNIFICANT CHANGE UP (ref 8.4–10.5)
CHLORIDE SERPL-SCNC: 103 MMOL/L — SIGNIFICANT CHANGE UP (ref 96–108)
CO2 SERPL-SCNC: 21 MMOL/L — LOW (ref 22–31)
CREAT SERPL-MCNC: 0.94 MG/DL — SIGNIFICANT CHANGE UP (ref 0.5–1.3)
GLUCOSE SERPL-MCNC: 130 MG/DL — HIGH (ref 70–99)
HCT VFR BLD CALC: 49.5 % — SIGNIFICANT CHANGE UP (ref 39–50)
HGB BLD-MCNC: 16.7 G/DL — SIGNIFICANT CHANGE UP (ref 13–17)
INR BLD: 1.05 RATIO — SIGNIFICANT CHANGE UP (ref 0.88–1.16)
MAGNESIUM SERPL-MCNC: 2 MG/DL — SIGNIFICANT CHANGE UP (ref 1.6–2.6)
MCHC RBC-ENTMCNC: 31.2 PG — SIGNIFICANT CHANGE UP (ref 27–34)
MCHC RBC-ENTMCNC: 33.8 GM/DL — SIGNIFICANT CHANGE UP (ref 32–36)
MCV RBC AUTO: 92.4 FL — SIGNIFICANT CHANGE UP (ref 80–100)
PLATELET # BLD AUTO: 204 K/UL — SIGNIFICANT CHANGE UP (ref 150–400)
POTASSIUM SERPL-MCNC: 3.8 MMOL/L — SIGNIFICANT CHANGE UP (ref 3.5–5.3)
POTASSIUM SERPL-SCNC: 3.8 MMOL/L — SIGNIFICANT CHANGE UP (ref 3.5–5.3)
PROTHROM AB SERPL-ACNC: 11.4 SEC — SIGNIFICANT CHANGE UP (ref 9.8–12.7)
RBC # BLD: 5.36 M/UL — SIGNIFICANT CHANGE UP (ref 4.2–5.8)
RBC # FLD: 12.8 % — SIGNIFICANT CHANGE UP (ref 10.3–14.5)
RH IG SCN BLD-IMP: POSITIVE — SIGNIFICANT CHANGE UP
RH IG SCN BLD-IMP: POSITIVE — SIGNIFICANT CHANGE UP
SODIUM SERPL-SCNC: 138 MMOL/L — SIGNIFICANT CHANGE UP (ref 135–145)
WBC # BLD: 7.8 K/UL — SIGNIFICANT CHANGE UP (ref 3.8–10.5)
WBC # FLD AUTO: 7.8 K/UL — SIGNIFICANT CHANGE UP (ref 3.8–10.5)

## 2018-08-30 PROCEDURE — 93623 PRGRMD STIMJ&PACG IV RX NFS: CPT | Mod: 26

## 2018-08-30 PROCEDURE — 93010 ELECTROCARDIOGRAM REPORT: CPT | Mod: 76

## 2018-08-30 PROCEDURE — 93654 COMPRE EP EVAL TX VT: CPT

## 2018-08-30 RX ORDER — MAGNESIUM OXIDE 400 MG ORAL TABLET 241.3 MG
400 TABLET ORAL ONCE
Qty: 0 | Refills: 0 | Status: COMPLETED | OUTPATIENT
Start: 2018-08-30 | End: 2018-08-30

## 2018-08-30 RX ORDER — POTASSIUM CHLORIDE 20 MEQ
20 PACKET (EA) ORAL ONCE
Qty: 0 | Refills: 0 | Status: COMPLETED | OUTPATIENT
Start: 2018-08-30 | End: 2018-08-30

## 2018-08-30 RX ADMIN — MAGNESIUM OXIDE 400 MG ORAL TABLET 400 MILLIGRAM(S): 241.3 TABLET ORAL at 22:08

## 2018-08-30 RX ADMIN — ATORVASTATIN CALCIUM 40 MILLIGRAM(S): 80 TABLET, FILM COATED ORAL at 22:08

## 2018-08-30 RX ADMIN — Medication 25 MILLIGRAM(S): at 22:08

## 2018-08-30 NOTE — DISCHARGE NOTE ADULT - PLAN OF CARE
You will no longer have ventricular tachycardia Continue Propafenone as ordered.  Call Dr. Fuentes's office on Tuesday for a follow up appointment. Your LDL cholesterol will be less than 70mg/dL Continue with your cholesterol medications. Eat a heart healthy diet that is low in saturated fats and salt, and includes whole grains, fruits, vegetables and lean protein; exercise regularly (consult with your physician or cardiologist first); maintain a heart healthy weight; if you smoke - quit (A resource to help you stop smoking is the [facility] – phone number [phone].). Continue to follow with your primary physician or cardiologist.

## 2018-08-30 NOTE — DISCHARGE NOTE ADULT - CARE PROVIDER_API CALL
Ed Riley), Cardiovascular Disease  [address] [address]  Phone: [phone]  Fax: [phone]    Rudy Fuentes), Cardiac Electrophysiology; Cardiology; Internal Medicine  [address] [place] [address]  Phone: [phone]

## 2018-08-30 NOTE — DISCHARGE NOTE ADULT - MEDICATION SUMMARY - MEDICATIONS TO TAKE
I will START or STAY ON the medications listed below when I get home from the hospital:    Aleve 220 mg oral tablet  -- as needed  -- Indication: For Pain    propafenone 150 mg oral tablet  -- 1 tab(s) by mouth 2 times a day MDD:2  -- Indication: For Non sustained ventricular tachycardia    atorvastatin 20 mg oral tablet  -- 1 tab(s) by mouth once a day MDD:1  -- Indication: For cholesterol    Nasacort  -- as needed  -- Indication: For Nasal congestion

## 2018-08-30 NOTE — PROGRESS NOTE ADULT - SUBJECTIVE AND OBJECTIVE BOX
INTERVAL HPI/OVERNIGHT EVENTS: Currently SR 70's with intermittent PVCs with runs of NSVT overnight. Patient denies chest pain/sob/dizziness. Endorses intermittent palpitations.     MEDICATIONS  (STANDING):  atorvastatin 40 milliGRAM(s) Oral at bedtime  enoxaparin Injectable 40 milliGRAM(s) SubCutaneous every 24 hours  metoprolol succinate ER 25 milliGRAM(s) Oral daily    MEDICATIONS  (PRN):  ondansetron Injectable 4 milliGRAM(s) IV Push every 6 hours PRN Nausea      Allergies  No Known Allergies    ROS:  General: Pt denies fever/chills  Cardiovascular: denies chest pain/palpitations  Respiratory and Thorax: denies SOB/cough/wheezing  Gastrointestinal: denies abdominal pain/diarrhea/bloody stool  Genitourinary: denies dysuria/ hematuria   Musculoskeletal: denies restricted motion  Hematologic: denies abnormal bleeding    Vital Signs Last 24 Hrs  T(C): 36.6 (30 Aug 2018 04:35), Max: 36.6 (30 Aug 2018 04:35)  T(F): 97.9 (30 Aug 2018 04:35), Max: 97.9 (30 Aug 2018 04:35)  HR: 75 (30 Aug 2018 04:35) (74 - 75)  BP: 107/69 (30 Aug 2018 04:35) (94/70 - 107/69)  BP(mean): --  RR: 17 (30 Aug 2018 04:35) (16 - 17)  SpO2: 97% (30 Aug 2018 04:35) (97% - 97%)    Physical Exam:  Constitutional: well developed, well nourished,  and no acute distress  Neurological: Alert & Oriented x 3,  no focal deficits  Respiratory: Breathing nonlabored; CTA bilaterally.   Cardiovascular: (+) S1 & S2, RRR  Gastrointestinal: soft, NT, nondistended, (+) BS  Extremities: +2 bilateral  DP pulses. No pedal edema  Skin:  normal skin color and pigmentation, no skin lesions      LABS:                        16.7   7.8   )-----------( 204      ( 30 Aug 2018 05:28 )             49.5     08-30    138  |  103  |  18  ----------------------------<  130<H>  3.8   |  21<L>  |  0.94    Ca    9.1      30 Aug 2018 05:28  Mg     2.0     08-30      PT/INR - ( 30 Aug 2018 05:28 )   PT: 11.4 sec;   INR: 1.05 ratio         PTT - ( 30 Aug 2018 05:28 )  PTT:29.7 sec      RADIOLOGY & ADDITIONAL TESTS: < from: Transthoracic Echocardiogram (08.28.18 @ 11:53) >  EF (Teicholtz): 51 %  < from: Transthoracic Echocardiogram (08.28.18 @ 11:53) >  Conclusions:  1. Normal left ventricular internal dimensions and wall  thicknesses.  2. Normal left ventricular systolic function. No segmental  wall motion abnormalities.  3. Normal right ventricular size and function.  4. Normal tricuspid valve. Mild tricuspid regurgitation.  5. Estimated pulmonary artery systolic pressure equals 26  mm Hg, assuming right atrial pressure equals 8  mm Hg,  consistent with normal pulmonary pressures.    < from: Nuclear Stress Test-Exercise (08.29.18 @ 12:51) >    IMPRESSIONS: Normal Study  * Exercise capacity: 12 METS, Good for age and gender.  * Symptom: Fatigue, vasovagal during recovery stage with  diaphoresis and low blood pressure. .  * HR Response: Appropriate.  * BP Response: Appropriate.  * Heart Rhythm: Sinus Bradycardia - Frequent VPD's - 59  BPM.  * Baseline ECG: Nonspecific ST-T wave abnormality.  * ECG Abnormalities: No ischemic ECG changes.  * Arrhythmia: Frequent VPDs occurred. Three ventricular  couplets noted.  * Review ofraw data shows: The study is of good technical  quality.  * The left ventricle was normal in size. Normal myocardial  perfusion scan, with no evidence of infarction or  inducible ischemia.  * Post-stress gated wall motion analysis was performed  (LVEF = 53 %;LVEDV = 86 ml.), revealing normal LV  function.      TELE: INTERVAL HPI/OVERNIGHT EVENTS: Currently SR 70's with intermittent PVCs with runs of NSVT longest up to 24 beats  for approx 13.2seconds overnight. Patient denies chest pain/sob/dizziness. Endorses intermittent palpitations.     MEDICATIONS  (STANDING):  atorvastatin 40 milliGRAM(s) Oral at bedtime  enoxaparin Injectable 40 milliGRAM(s) SubCutaneous every 24 hours  metoprolol succinate ER 25 milliGRAM(s) Oral daily    MEDICATIONS  (PRN):  ondansetron Injectable 4 milliGRAM(s) IV Push every 6 hours PRN Nausea      Allergies  No Known Allergies    ROS:  General: Pt denies fever/chills  Cardiovascular: denies chest pain/palpitations  Respiratory and Thorax: denies SOB/cough/wheezing  Gastrointestinal: denies abdominal pain/diarrhea/bloody stool  Genitourinary: denies dysuria/ hematuria   Musculoskeletal: denies restricted motion  Hematologic: denies abnormal bleeding    Vital Signs Last 24 Hrs  T(C): 36.6 (30 Aug 2018 04:35), Max: 36.6 (30 Aug 2018 04:35)  T(F): 97.9 (30 Aug 2018 04:35), Max: 97.9 (30 Aug 2018 04:35)  HR: 75 (30 Aug 2018 04:35) (74 - 75)  BP: 107/69 (30 Aug 2018 04:35) (94/70 - 107/69)  BP(mean): --  RR: 17 (30 Aug 2018 04:35) (16 - 17)  SpO2: 97% (30 Aug 2018 04:35) (97% - 97%)    Physical Exam:  Constitutional: well developed, well nourished,  and no acute distress  Neurological: Alert & Oriented x 3,  no focal deficits  Respiratory: Breathing nonlabored; CTA bilaterally.   Cardiovascular: (+) S1 & S2, RRR  Gastrointestinal: soft, NT, nondistended, (+) BS  Extremities: +2 bilateral  DP pulses. No pedal edema  Skin:  normal skin color and pigmentation, no skin lesions      LABS:                        16.7   7.8   )-----------( 204      ( 30 Aug 2018 05:28 )             49.5     08-30    138  |  103  |  18  ----------------------------<  130<H>  3.8   |  21<L>  |  0.94    Ca    9.1      30 Aug 2018 05:28  Mg     2.0     08-30      PT/INR - ( 30 Aug 2018 05:28 )   PT: 11.4 sec;   INR: 1.05 ratio         PTT - ( 30 Aug 2018 05:28 )  PTT:29.7 sec      RADIOLOGY & ADDITIONAL TESTS: < from: Transthoracic Echocardiogram (08.28.18 @ 11:53) >  EF (Teicholtz): 51 %  < from: Transthoracic Echocardiogram (08.28.18 @ 11:53) >  Conclusions:  1. Normal left ventricular internal dimensions and wall  thicknesses.  2. Normal left ventricular systolic function. No segmental  wall motion abnormalities.  3. Normal right ventricular size and function.  4. Normal tricuspid valve. Mild tricuspid regurgitation.  5. Estimated pulmonary artery systolic pressure equals 26  mm Hg, assuming right atrial pressure equals 8  mm Hg,  consistent with normal pulmonary pressures.    < from: Nuclear Stress Test-Exercise (08.29.18 @ 12:51) >    IMPRESSIONS: Normal Study  * Exercise capacity: 12 METS, Good for age and gender.  * Symptom: Fatigue, vasovagal during recovery stage with  diaphoresis and low blood pressure. .  * HR Response: Appropriate.  * BP Response: Appropriate.  * Heart Rhythm: Sinus Bradycardia - Frequent VPD's - 59  BPM.  * Baseline ECG: Nonspecific ST-T wave abnormality.  * ECG Abnormalities: No ischemic ECG changes.  * Arrhythmia: Frequent VPDs occurred. Three ventricular  couplets noted.  * Review ofraw data shows: The study is of good technical  quality.  * The left ventricle was normal in size. Normal myocardial  perfusion scan, with no evidence of infarction or  inducible ischemia.  * Post-stress gated wall motion analysis was performed  (LVEF = 53 %;LVEDV = 86 ml.), revealing normal LV  function.      TELE: Sinus rhythm with PVCs. Runs of NSVT  overnight  6, 7 beats with longest up to 24 beats x 13.2  seconds.

## 2018-08-30 NOTE — DISCHARGE NOTE ADULT - PATIENT PORTAL LINK FT
You can access the [facility] Patient Portal, offered by [facility], by registering with the following website: [email or web address]

## 2018-08-30 NOTE — DISCHARGE NOTE ADULT - CARE PLAN
Principal Discharge DX:	Ventricular tachycardia  Goal:	You will no longer have ventricular tachycardia  Assessment and plan of treatment:	Continue Propafenone as ordered.  Call Dr. Fuentes's office on Tuesday for a follow up appointment.  Secondary Diagnosis:	Hyperlipidemia, unspecified hyperlipidemia type  Goal:	Your LDL cholesterol will be less than 70mg/dL  Assessment and plan of treatment:	Continue with your cholesterol medications. Eat a heart healthy diet that is low in saturated fats and salt, and includes whole grains, fruits, vegetables and lean protein; exercise regularly (consult with your physician or cardiologist first); maintain a heart healthy weight; if you smoke - quit (A resource to help you stop smoking is the [facility] – phone number [phone].). Continue to follow with your primary physician or cardiologist.

## 2018-08-30 NOTE — PROGRESS NOTE ADULT - SUBJECTIVE AND OBJECTIVE BOX
Patient is a 43y old  Male who presents with a chief complaint of chest pain (27 Aug 2018 14:37)      INTERVAL HPI/OVERNIGHT EVENTS:  T(C): 36.6 (08-30-18 @ 04:35), Max: 36.6 (08-30-18 @ 04:35)  HR: 73 (08-30-18 @ 18:50) (61 - 76)  BP: 122/87 (08-30-18 @ 18:50) (102/70 - 124/92)  RR: 16 (08-30-18 @ 18:50) (16 - 17)  SpO2: 100% (08-30-18 @ 18:50) (97% - 100%)  Wt(kg): --  I&O's Summary    29 Aug 2018 07:01  -  30 Aug 2018 07:00  --------------------------------------------------------  IN: 720 mL / OUT: 0 mL / NET: 720 mL    30 Aug 2018 07:01  -  30 Aug 2018 19:42  --------------------------------------------------------  IN: 0 mL / OUT: 0 mL / NET: 0 mL        LABS:                        16.7   7.8   )-----------( 204      ( 30 Aug 2018 05:28 )             49.5     08-30    138  |  103  |  18  ----------------------------<  130<H>  3.8   |  21<L>  |  0.94    Ca    9.1      30 Aug 2018 05:28  Mg     2.0     08-30      PT/INR - ( 30 Aug 2018 05:28 )   PT: 11.4 sec;   INR: 1.05 ratio         PTT - ( 30 Aug 2018 05:28 )  PTT:29.7 sec    CAPILLARY BLOOD GLUCOSE                MEDICATIONS  (STANDING):  atorvastatin 40 milliGRAM(s) Oral at bedtime  enoxaparin Injectable 40 milliGRAM(s) SubCutaneous every 24 hours  magnesium oxide 400 milliGRAM(s) Oral once  metoprolol succinate ER 25 milliGRAM(s) Oral daily  potassium chloride    Tablet ER 20 milliEquivalent(s) Oral once    MEDICATIONS  (PRN):  ondansetron Injectable 4 milliGRAM(s) IV Push every 6 hours PRN Nausea          PHYSICAL EXAM:  GENERAL: NAD, well-groomed, well-developed  HEAD:  Atraumatic, Normocephalic  CHEST/LUNG: Clear to percussion bilaterally; No rales, rhonchi, wheezing, or rubs  HEART: Regular rate and rhythm; No murmurs, rubs, or gallops  ABDOMEN: Soft, Nontender, Nondistended; Bowel sounds present  EXTREMITIES:  2+ Peripheral Pulses, No clubbing, cyanosis, or edema  LYMPH: No lymphadenopathy noted  SKIN: No rashes or lesions    Care Discussed with Consultants/Other Providers [ ] YES  [ ] NO

## 2018-08-30 NOTE — DISCHARGE NOTE ADULT - HOSPITAL COURSE
44 y/o M pt w/ PMH of HLD presenting to the ED w/ a complaint of chest pain. Pt sent in by his cardiologist Dr. Ed Riley. Pt stated that approximately 2 weeks ago he began to have a fluttering feeling in his chest that comes and goes. Reports increased coughing with the feeling, but denies shortness of breath, dizziness, nausea/vomiting. Saw cardiologist last Monday who placed him on 24 hr halter monitor. Was prescribed metoprolol 25 mg once a day on Friday which he has been taking. Was told this morning by cardiologist that he should come to the ED for further evaluation. Pt reports cardiologist told him his "heart was skipping a beat." Reports last feeling the fluttering yesterday and is currently symptom free. When he does get the feeling he says it starts on the L side of his chest and radiates to the right and down to his upper stomach. Reports stressful job and increased stress at home due to pt's fiance's mother moving in 3 months ago. No additional complaints at this time.  Pt s/p EPS via R groin. Pt tolerated procedure well and overnight remained uneventful. No c/o chest pain or SOB. Pt is hemodynamically stable, EKG and all lab results reviewed. Insertion/incision site benign, no bleeding or hematoma. Discharge teaching provided to Pt/caretaker and verbalized understanding the instruction. Pt is stable for discharge home as per attending. 44 y/o M pt w/ PMH of HLD presenting to the ED w/ a complaint of chest pain. Pt sent in by his cardiologist Dr. Ed Riley. Pt stated that approximately 2 weeks ago he began to have a fluttering feeling in his chest that comes and goes. Reports increased coughing with the feeling, but denies shortness of breath, dizziness, nausea/vomiting. Saw cardiologist last Monday who placed him on 24 hr halter monitor. Was prescribed metoprolol 25 mg once a day on Friday which he has been taking. Was told this morning by cardiologist that he should come to the ED for further evaluation. Pt reports cardiologist told him his "heart was skipping a beat." Reports last feeling the fluttering yesterday and is currently symptom free. When he does get the feeling he says it starts on the L side of his chest and radiates to the right and down to his upper stomach. Reports stressful job and increased stress at home due to pt's fiance's mother moving in 3 months ago. No additional complaints at this time.  Echo on 8/28/18 had no wall motion abnormalities and EF 51%. Pt had a normal nuclear stress test on 8/29/18. S/p EPS 8/30/18 via R groin. Pt tolerated procedure well. He continued to have WCT on tele following procedure. Per EP recommendations Toprol was discontinued and Propafenone 150mg BID was initiated. No additional NSVT (after noted 6 beats - see EP note from today). Pt remains clinically stable and is medically cleared for discharge home today by Dr. Allen and Dr. Fuentes.

## 2018-08-30 NOTE — PROGRESS NOTE ADULT - ASSESSMENT
43 year old male non-smoker with HLD presenting to ED with at recommendation of his cardiologist Dr. Ed Riley for NSVT seen on holter. He is s/p Stress test 8/29 which was normal. Echo with no wall motion abnormalities and  EF of 51%.    # Idiopathic VT;  possible RVOT vs cusp origin    - Keep NPO for Ablation later today ( patient is 2nd case)  - Type and screen sent, result pending.  - Monitor and replete electrolytes; keep K+>4 and Mg++ >2  - c/w with metoprolol 25mg ER daily  - Continue telemetry monitoring.     [id number]

## 2018-08-30 NOTE — PROGRESS NOTE ADULT - ASSESSMENT
Problem/Plan - 1:  ·  Problem: Palpitations.  Plan: telemonitor   ro ACS  EP fu  management as per cards.   stress test -ve  EP study today    Problem/Plan - 2:  ·  Problem: Hyperlipidemia, unspecified hyperlipidemia type.  Plan: cw statin.     case dw the wife

## 2018-08-30 NOTE — DISCHARGE NOTE ADULT - ADDITIONAL INSTRUCTIONS
Follow up with your primary doctor in 1-2 weeks.   Please call Dr. Fuentes's office on Tuesday for a follow up appointment.

## 2018-08-31 VITALS
TEMPERATURE: 98 F | SYSTOLIC BLOOD PRESSURE: 114 MMHG | DIASTOLIC BLOOD PRESSURE: 83 MMHG | OXYGEN SATURATION: 99 % | HEART RATE: 65 BPM | RESPIRATION RATE: 16 BRPM

## 2018-08-31 PROBLEM — Z00.00 ENCOUNTER FOR PREVENTIVE HEALTH EXAMINATION: Status: ACTIVE | Noted: 2018-08-31

## 2018-08-31 LAB
ANION GAP SERPL CALC-SCNC: 13 MMOL/L — SIGNIFICANT CHANGE UP (ref 5–17)
BUN SERPL-MCNC: 14 MG/DL — SIGNIFICANT CHANGE UP (ref 7–23)
CALCIUM SERPL-MCNC: 9.1 MG/DL — SIGNIFICANT CHANGE UP (ref 8.4–10.5)
CHLORIDE SERPL-SCNC: 102 MMOL/L — SIGNIFICANT CHANGE UP (ref 96–108)
CO2 SERPL-SCNC: 23 MMOL/L — SIGNIFICANT CHANGE UP (ref 22–31)
CREAT SERPL-MCNC: 1.03 MG/DL — SIGNIFICANT CHANGE UP (ref 0.5–1.3)
GLUCOSE SERPL-MCNC: 121 MG/DL — HIGH (ref 70–99)
HCT VFR BLD CALC: 48.6 % — SIGNIFICANT CHANGE UP (ref 39–50)
HGB BLD-MCNC: 16.5 G/DL — SIGNIFICANT CHANGE UP (ref 13–17)
MCHC RBC-ENTMCNC: 31.4 PG — SIGNIFICANT CHANGE UP (ref 27–34)
MCHC RBC-ENTMCNC: 33.9 GM/DL — SIGNIFICANT CHANGE UP (ref 32–36)
MCV RBC AUTO: 92.7 FL — SIGNIFICANT CHANGE UP (ref 80–100)
PLATELET # BLD AUTO: 183 K/UL — SIGNIFICANT CHANGE UP (ref 150–400)
POTASSIUM SERPL-MCNC: 4.4 MMOL/L — SIGNIFICANT CHANGE UP (ref 3.5–5.3)
POTASSIUM SERPL-SCNC: 4.4 MMOL/L — SIGNIFICANT CHANGE UP (ref 3.5–5.3)
RBC # BLD: 5.24 M/UL — SIGNIFICANT CHANGE UP (ref 4.2–5.8)
RBC # FLD: 12.7 % — SIGNIFICANT CHANGE UP (ref 10.3–14.5)
SODIUM SERPL-SCNC: 138 MMOL/L — SIGNIFICANT CHANGE UP (ref 135–145)
WBC # BLD: 10.6 K/UL — HIGH (ref 3.8–10.5)
WBC # FLD AUTO: 10.6 K/UL — HIGH (ref 3.8–10.5)

## 2018-08-31 PROCEDURE — 85027 COMPLETE CBC AUTOMATED: CPT

## 2018-08-31 PROCEDURE — C1732: CPT

## 2018-08-31 PROCEDURE — 93623 PRGRMD STIMJ&PACG IV RX NFS: CPT

## 2018-08-31 PROCEDURE — 99285 EMERGENCY DEPT VISIT HI MDM: CPT | Mod: 25

## 2018-08-31 PROCEDURE — 82553 CREATINE MB FRACTION: CPT

## 2018-08-31 PROCEDURE — C1894: CPT

## 2018-08-31 PROCEDURE — 80061 LIPID PANEL: CPT

## 2018-08-31 PROCEDURE — 82550 ASSAY OF CK (CPK): CPT

## 2018-08-31 PROCEDURE — 93017 CV STRESS TEST TRACING ONLY: CPT

## 2018-08-31 PROCEDURE — A9500: CPT

## 2018-08-31 PROCEDURE — 83036 HEMOGLOBIN GLYCOSYLATED A1C: CPT

## 2018-08-31 PROCEDURE — 86901 BLOOD TYPING SEROLOGIC RH(D): CPT

## 2018-08-31 PROCEDURE — C1893: CPT

## 2018-08-31 PROCEDURE — 85730 THROMBOPLASTIN TIME PARTIAL: CPT

## 2018-08-31 PROCEDURE — 80048 BASIC METABOLIC PNL TOTAL CA: CPT

## 2018-08-31 PROCEDURE — 80053 COMPREHEN METABOLIC PANEL: CPT

## 2018-08-31 PROCEDURE — 78452 HT MUSCLE IMAGE SPECT MULT: CPT

## 2018-08-31 PROCEDURE — 86850 RBC ANTIBODY SCREEN: CPT

## 2018-08-31 PROCEDURE — 84443 ASSAY THYROID STIM HORMONE: CPT

## 2018-08-31 PROCEDURE — C1730: CPT

## 2018-08-31 PROCEDURE — 93306 TTE W/DOPPLER COMPLETE: CPT

## 2018-08-31 PROCEDURE — 93613 INTRACARDIAC EPHYS 3D MAPG: CPT

## 2018-08-31 PROCEDURE — 84100 ASSAY OF PHOSPHORUS: CPT

## 2018-08-31 PROCEDURE — 85610 PROTHROMBIN TIME: CPT

## 2018-08-31 PROCEDURE — 93005 ELECTROCARDIOGRAM TRACING: CPT

## 2018-08-31 PROCEDURE — 86900 BLOOD TYPING SEROLOGIC ABO: CPT

## 2018-08-31 PROCEDURE — 84484 ASSAY OF TROPONIN QUANT: CPT

## 2018-08-31 PROCEDURE — 93654 COMPRE EP EVAL TX VT: CPT

## 2018-08-31 PROCEDURE — 93010 ELECTROCARDIOGRAM REPORT: CPT

## 2018-08-31 PROCEDURE — 83735 ASSAY OF MAGNESIUM: CPT

## 2018-08-31 PROCEDURE — 96365 THER/PROPH/DIAG IV INF INIT: CPT

## 2018-08-31 PROCEDURE — 71046 X-RAY EXAM CHEST 2 VIEWS: CPT

## 2018-08-31 RX ORDER — ATORVASTATIN CALCIUM 80 MG/1
1 TABLET, FILM COATED ORAL
Qty: 0 | Refills: 0 | COMMUNITY
Start: 2018-08-31

## 2018-08-31 RX ORDER — PROPAFENONE HCL 150 MG
1 TABLET ORAL
Qty: 0 | Refills: 0 | COMMUNITY
Start: 2018-08-31

## 2018-08-31 RX ORDER — METOPROLOL TARTRATE 50 MG
1 TABLET ORAL
Qty: 0 | Refills: 0 | COMMUNITY

## 2018-08-31 RX ORDER — ATORVASTATIN CALCIUM 80 MG/1
1 TABLET, FILM COATED ORAL
Qty: 1 | Refills: 0 | OUTPATIENT
Start: 2018-08-31 | End: 2018-09-29

## 2018-08-31 RX ORDER — METOPROLOL TARTRATE 50 MG
50 TABLET ORAL DAILY
Qty: 0 | Refills: 0 | Status: DISCONTINUED | OUTPATIENT
Start: 2018-08-31 | End: 2018-08-31

## 2018-08-31 RX ORDER — PROPAFENONE HCL 150 MG
150 TABLET ORAL
Qty: 0 | Refills: 0 | Status: DISCONTINUED | OUTPATIENT
Start: 2018-08-31 | End: 2018-08-31

## 2018-08-31 RX ORDER — PROPAFENONE HCL 150 MG
1 TABLET ORAL
Qty: 60 | Refills: 0 | OUTPATIENT
Start: 2018-08-31 | End: 2018-09-29

## 2018-08-31 RX ADMIN — Medication 150 MILLIGRAM(S): at 09:40

## 2018-08-31 NOTE — PROGRESS NOTE ADULT - SUBJECTIVE AND OBJECTIVE BOX
Patient seen and examined at bedside.    Overnight Events: No acute events.     Telemetry:    Review Of Systems: No chest pain, shortness of breath, or palpitations            Medications:  atorvastatin 40 milliGRAM(s) Oral at bedtime  enoxaparin Injectable 40 milliGRAM(s) SubCutaneous every 24 hours  ondansetron Injectable 4 milliGRAM(s) IV Push every 6 hours PRN  propafenone 150 milliGRAM(s) Oral two times a day    PAST MEDICAL & SURGICAL HISTORY:  Hyperlipidemia, unspecified hyperlipidemia type  No significant past surgical history    Vitals:  T(F): 98.1 (08-31), Max: 98.3 (08-30)  HR: 72 (08-31) (61 - 76)  BP: 114/80 (08-31) (114/75 - 125/87)  RR: 17 (08-31)  SpO2: 97% (08-31)  I&O's Summary    30 Aug 2018 07:01  -  31 Aug 2018 07:00  --------------------------------------------------------  IN: 240 mL / OUT: 0 mL / NET: 240 mL    Physical Exam:  Appearance: No acute distress; well appearing  Eyes: PERRL, EOMI, pink conjunctiva  HENT: Normal oral muscosa  Cardiovascular: RRR, S1, S2, no murmurs, rubs, or gallops; no edema; no JVD  Respiratory: Clear to auscultation bilaterally  Gastrointestinal: soft, non-tender, non-distended with normal bowel sounds  Musculoskeletal: No clubbing; no joint deformity   Neurologic: Non-focal  Skin: No rashes, ecchymoses, or cyanosis    Labs:                        16.5   10.6  )-----------( 183      ( 31 Aug 2018 05:15 )             48.6     08-31    138  |  102  |  14  ----------------------------<  121<H>  4.4   |  23  |  1.03    Ca    9.1      31 Aug 2018 05:15  Mg     2.0     08-30      PT/INR - ( 30 Aug 2018 05:28 )   PT: 11.4 sec;   INR: 1.05 ratio      PTT - ( 30 Aug 2018 05:28 )  PTT:29.7 sec Patient seen and examined at bedside.    Overnight Events: No acute events.     Telemetry: NSR 55-70, PVCs, Couplets, Triplets, NSVT (6 beats longest)    Review Of Systems: No chest pain, shortness of breath, or palpitations            Medications:  atorvastatin 40 milliGRAM(s) Oral at bedtime  enoxaparin Injectable 40 milliGRAM(s) SubCutaneous every 24 hours  ondansetron Injectable 4 milliGRAM(s) IV Push every 6 hours PRN  propafenone 150 milliGRAM(s) Oral two times a day    PAST MEDICAL & SURGICAL HISTORY:  Hyperlipidemia, unspecified hyperlipidemia type  No significant past surgical history    Vitals:  T(F): 98.1 (08-31), Max: 98.3 (08-30)  HR: 72 (08-31) (61 - 76)  BP: 114/80 (08-31) (114/75 - 125/87)  RR: 17 (08-31)  SpO2: 97% (08-31)  I&O's Summary    30 Aug 2018 07:01  -  31 Aug 2018 07:00  --------------------------------------------------------  IN: 240 mL / OUT: 0 mL / NET: 240 mL    Physical Exam:  Appearance: No acute distress; well appearing  Eyes: PERRL, EOMI, pink conjunctiva  HENT: Normal oral muscosa  Cardiovascular: RRR, S1, S2, no murmurs, rubs, or gallops; no edema; no JVD  Respiratory: Clear to auscultation bilaterally  Gastrointestinal: soft, non-tender, non-distended with normal bowel sounds  Musculoskeletal: No clubbing; no joint deformity   Neurologic: Non-focal  Skin: No rashes, ecchymoses, or cyanosis    Labs:                        16.5   10.6  )-----------( 183      ( 31 Aug 2018 05:15 )             48.6     08-31    138  |  102  |  14  ----------------------------<  121<H>  4.4   |  23  |  1.03    Ca    9.1      31 Aug 2018 05:15  Mg     2.0     08-30      PT/INR - ( 30 Aug 2018 05:28 )   PT: 11.4 sec;   INR: 1.05 ratio      PTT - ( 30 Aug 2018 05:28 )  PTT:29.7 sec

## 2018-08-31 NOTE — PROGRESS NOTE ADULT - PROVIDER SPECIALTY LIST ADULT
Electrophysiology
Electrophysiology
Hospitalist
Hospitalist
Electrophysiology
Electrophysiology
Hospitalist

## 2018-10-04 PROBLEM — Z87.898 HISTORY OF CHEST PAIN: Status: RESOLVED | Noted: 2018-10-04 | Resolved: 2018-10-04

## 2018-10-04 RX ORDER — ATORVASTATIN CALCIUM 20 MG/1
20 TABLET, FILM COATED ORAL
Refills: 1 | Status: ACTIVE | COMMUNITY

## 2018-10-05 ENCOUNTER — NON-APPOINTMENT (OUTPATIENT)
Age: 43
End: 2018-10-05

## 2018-10-05 ENCOUNTER — APPOINTMENT (OUTPATIENT)
Dept: ELECTROPHYSIOLOGY | Facility: CLINIC | Age: 43
End: 2018-10-05
Payer: COMMERCIAL

## 2018-10-05 ENCOUNTER — TRANSCRIPTION ENCOUNTER (OUTPATIENT)
Age: 43
End: 2018-10-05

## 2018-10-05 VITALS
WEIGHT: 175 LBS | DIASTOLIC BLOOD PRESSURE: 78 MMHG | SYSTOLIC BLOOD PRESSURE: 117 MMHG | BODY MASS INDEX: 26.52 KG/M2 | OXYGEN SATURATION: 97 % | HEART RATE: 77 BPM | HEIGHT: 68 IN

## 2018-10-05 DIAGNOSIS — Z87.898 PERSONAL HISTORY OF OTHER SPECIFIED CONDITIONS: ICD-10-CM

## 2018-10-05 DIAGNOSIS — I47.2 VENTRICULAR TACHYCARDIA: ICD-10-CM

## 2018-10-05 DIAGNOSIS — E78.5 HYPERLIPIDEMIA, UNSPECIFIED: ICD-10-CM

## 2018-10-05 PROCEDURE — 93000 ELECTROCARDIOGRAM COMPLETE: CPT | Mod: 59

## 2018-10-05 PROCEDURE — 99213 OFFICE O/P EST LOW 20 MIN: CPT

## 2018-10-05 RX ORDER — FLECAINIDE ACETATE 50 MG/1
50 TABLET ORAL
Qty: 60 | Refills: 5 | Status: ACTIVE | COMMUNITY
Start: 2018-10-05 | End: 1900-01-01

## 2018-10-05 RX ORDER — PROPAFENONE HYDROCHLORIDE 150 MG/1
150 TABLET, FILM COATED ORAL
Qty: 60 | Refills: 0 | Status: DISCONTINUED | COMMUNITY
Start: 1900-01-01 | End: 2018-10-05

## 2018-10-09 PROBLEM — I47.2 NSVT (NONSUSTAINED VENTRICULAR TACHYCARDIA): Status: ACTIVE | Noted: 2018-09-27

## 2018-10-09 PROBLEM — E78.5 HYPERLIPIDEMIA: Status: ACTIVE | Noted: 2018-10-04

## 2018-10-11 ENCOUNTER — FORM ENCOUNTER (OUTPATIENT)
Age: 43
End: 2018-10-11

## 2019-05-28 ENCOUNTER — TRANSCRIPTION ENCOUNTER (OUTPATIENT)
Age: 44
End: 2019-05-28

## 2019-05-29 ENCOUNTER — RESULT REVIEW (OUTPATIENT)
Age: 44
End: 2019-05-29

## 2019-05-29 ENCOUNTER — INPATIENT (INPATIENT)
Facility: HOSPITAL | Age: 44
LOS: 0 days | Discharge: HOME | DRG: 343 | End: 2019-05-30
Attending: SPECIALIST | Admitting: SPECIALIST
Payer: COMMERCIAL

## 2019-05-29 VITALS
HEIGHT: 68 IN | HEART RATE: 108 BPM | RESPIRATION RATE: 19 BRPM | TEMPERATURE: 99 F | WEIGHT: 175.05 LBS | SYSTOLIC BLOOD PRESSURE: 144 MMHG | DIASTOLIC BLOOD PRESSURE: 83 MMHG | OXYGEN SATURATION: 97 %

## 2019-05-29 DIAGNOSIS — Z98.890 OTHER SPECIFIED POSTPROCEDURAL STATES: Chronic | ICD-10-CM

## 2019-05-29 DIAGNOSIS — K37 UNSPECIFIED APPENDICITIS: ICD-10-CM

## 2019-05-29 LAB
ALBUMIN SERPL ELPH-MCNC: 4.4 G/DL — SIGNIFICANT CHANGE UP (ref 3.3–5)
ALP SERPL-CCNC: 76 U/L — SIGNIFICANT CHANGE UP (ref 40–120)
ALT FLD-CCNC: 38 U/L — SIGNIFICANT CHANGE UP (ref 12–78)
ANION GAP SERPL CALC-SCNC: 5 MMOL/L — SIGNIFICANT CHANGE UP (ref 5–17)
APPEARANCE UR: CLEAR — SIGNIFICANT CHANGE UP
APTT BLD: 29.2 SEC — SIGNIFICANT CHANGE UP (ref 28.5–37)
AST SERPL-CCNC: 25 U/L — SIGNIFICANT CHANGE UP (ref 15–37)
BASOPHILS # BLD AUTO: 0.03 K/UL — SIGNIFICANT CHANGE UP (ref 0–0.2)
BASOPHILS NFR BLD AUTO: 0.4 % — SIGNIFICANT CHANGE UP (ref 0–2)
BILIRUB SERPL-MCNC: 1.6 MG/DL — HIGH (ref 0.2–1.2)
BILIRUB UR-MCNC: ABNORMAL
BUN SERPL-MCNC: 13 MG/DL — SIGNIFICANT CHANGE UP (ref 7–23)
CALCIUM SERPL-MCNC: 8.8 MG/DL — SIGNIFICANT CHANGE UP (ref 8.5–10.1)
CHLORIDE SERPL-SCNC: 102 MMOL/L — SIGNIFICANT CHANGE UP (ref 96–108)
CO2 SERPL-SCNC: 29 MMOL/L — SIGNIFICANT CHANGE UP (ref 22–31)
COLOR SPEC: ABNORMAL
CREAT SERPL-MCNC: 1.1 MG/DL — SIGNIFICANT CHANGE UP (ref 0.5–1.3)
DIFF PNL FLD: ABNORMAL
EOSINOPHIL # BLD AUTO: 0.01 K/UL — SIGNIFICANT CHANGE UP (ref 0–0.5)
EOSINOPHIL NFR BLD AUTO: 0.1 % — SIGNIFICANT CHANGE UP (ref 0–6)
GLUCOSE SERPL-MCNC: 115 MG/DL — HIGH (ref 70–99)
GLUCOSE UR QL: NEGATIVE — SIGNIFICANT CHANGE UP
HCT VFR BLD CALC: 48.6 % — SIGNIFICANT CHANGE UP (ref 39–50)
HGB BLD-MCNC: 16.8 G/DL — SIGNIFICANT CHANGE UP (ref 13–17)
IMM GRANULOCYTES NFR BLD AUTO: 0.3 % — SIGNIFICANT CHANGE UP (ref 0–1.5)
INR BLD: 1.2 RATIO — HIGH (ref 0.88–1.16)
KETONES UR-MCNC: ABNORMAL
LEUKOCYTE ESTERASE UR-ACNC: ABNORMAL
LIDOCAIN IGE QN: 69 U/L — LOW (ref 73–393)
LYMPHOCYTES # BLD AUTO: 0.93 K/UL — LOW (ref 1–3.3)
LYMPHOCYTES # BLD AUTO: 13.2 % — SIGNIFICANT CHANGE UP (ref 13–44)
MCHC RBC-ENTMCNC: 31.4 PG — SIGNIFICANT CHANGE UP (ref 27–34)
MCHC RBC-ENTMCNC: 34.6 GM/DL — SIGNIFICANT CHANGE UP (ref 32–36)
MCV RBC AUTO: 90.8 FL — SIGNIFICANT CHANGE UP (ref 80–100)
MONOCYTES # BLD AUTO: 0.7 K/UL — SIGNIFICANT CHANGE UP (ref 0–0.9)
MONOCYTES NFR BLD AUTO: 9.9 % — SIGNIFICANT CHANGE UP (ref 2–14)
NEUTROPHILS # BLD AUTO: 5.36 K/UL — SIGNIFICANT CHANGE UP (ref 1.8–7.4)
NEUTROPHILS NFR BLD AUTO: 76.1 % — SIGNIFICANT CHANGE UP (ref 43–77)
NITRITE UR-MCNC: NEGATIVE — SIGNIFICANT CHANGE UP
NRBC # BLD: 0 /100 WBCS — SIGNIFICANT CHANGE UP (ref 0–0)
PH UR: 5 — SIGNIFICANT CHANGE UP (ref 5–8)
PLATELET # BLD AUTO: 175 K/UL — SIGNIFICANT CHANGE UP (ref 150–400)
POTASSIUM SERPL-MCNC: 3.8 MMOL/L — SIGNIFICANT CHANGE UP (ref 3.5–5.3)
POTASSIUM SERPL-SCNC: 3.8 MMOL/L — SIGNIFICANT CHANGE UP (ref 3.5–5.3)
PROT SERPL-MCNC: 8.1 G/DL — SIGNIFICANT CHANGE UP (ref 6–8.3)
PROT UR-MCNC: 25 MG/DL
PROTHROM AB SERPL-ACNC: 13.7 SEC — HIGH (ref 10–12.9)
RBC # BLD: 5.35 M/UL — SIGNIFICANT CHANGE UP (ref 4.2–5.8)
RBC # FLD: 12.8 % — SIGNIFICANT CHANGE UP (ref 10.3–14.5)
SODIUM SERPL-SCNC: 136 MMOL/L — SIGNIFICANT CHANGE UP (ref 135–145)
SP GR SPEC: 1.02 — SIGNIFICANT CHANGE UP (ref 1.01–1.02)
UROBILINOGEN FLD QL: 8
WBC # BLD: 7.05 K/UL — SIGNIFICANT CHANGE UP (ref 3.8–10.5)
WBC # FLD AUTO: 7.05 K/UL — SIGNIFICANT CHANGE UP (ref 3.8–10.5)

## 2019-05-29 PROCEDURE — 88304 TISSUE EXAM BY PATHOLOGIST: CPT | Mod: 26

## 2019-05-29 PROCEDURE — 44970 LAPAROSCOPY APPENDECTOMY: CPT | Mod: AS

## 2019-05-29 PROCEDURE — 93010 ELECTROCARDIOGRAM REPORT: CPT

## 2019-05-29 PROCEDURE — 74177 CT ABD & PELVIS W/CONTRAST: CPT | Mod: 26

## 2019-05-29 PROCEDURE — 99281 EMR DPT VST MAYX REQ PHY/QHP: CPT

## 2019-05-29 PROCEDURE — 44970 LAPAROSCOPY APPENDECTOMY: CPT

## 2019-05-29 RX ORDER — ONDANSETRON 8 MG/1
4 TABLET, FILM COATED ORAL ONCE
Refills: 0 | Status: COMPLETED | OUTPATIENT
Start: 2019-05-29 | End: 2019-05-29

## 2019-05-29 RX ORDER — SODIUM CHLORIDE 9 MG/ML
3 INJECTION INTRAMUSCULAR; INTRAVENOUS; SUBCUTANEOUS ONCE
Refills: 0 | Status: COMPLETED | OUTPATIENT
Start: 2019-05-29 | End: 2019-05-29

## 2019-05-29 RX ORDER — IOHEXOL 300 MG/ML
30 INJECTION, SOLUTION INTRAVENOUS ONCE
Refills: 0 | Status: COMPLETED | OUTPATIENT
Start: 2019-05-29 | End: 2019-05-29

## 2019-05-29 RX ORDER — OXYCODONE HYDROCHLORIDE 5 MG/1
5 TABLET ORAL ONCE
Refills: 0 | Status: DISCONTINUED | OUTPATIENT
Start: 2019-05-29 | End: 2019-05-29

## 2019-05-29 RX ORDER — MORPHINE SULFATE 50 MG/1
4 CAPSULE, EXTENDED RELEASE ORAL ONCE
Refills: 0 | Status: DISCONTINUED | OUTPATIENT
Start: 2019-05-29 | End: 2019-05-29

## 2019-05-29 RX ORDER — SODIUM CHLORIDE 9 MG/ML
1000 INJECTION, SOLUTION INTRAVENOUS
Refills: 0 | Status: DISCONTINUED | OUTPATIENT
Start: 2019-05-29 | End: 2019-05-30

## 2019-05-29 RX ORDER — IBUPROFEN 200 MG
600 TABLET ORAL EVERY 8 HOURS
Refills: 0 | Status: DISCONTINUED | OUTPATIENT
Start: 2019-05-29 | End: 2019-05-30

## 2019-05-29 RX ORDER — HYDROMORPHONE HYDROCHLORIDE 2 MG/ML
1 INJECTION INTRAMUSCULAR; INTRAVENOUS; SUBCUTANEOUS EVERY 4 HOURS
Refills: 0 | Status: DISCONTINUED | OUTPATIENT
Start: 2019-05-29 | End: 2019-05-30

## 2019-05-29 RX ORDER — SODIUM CHLORIDE 9 MG/ML
1000 INJECTION INTRAMUSCULAR; INTRAVENOUS; SUBCUTANEOUS ONCE
Refills: 0 | Status: COMPLETED | OUTPATIENT
Start: 2019-05-29 | End: 2019-05-29

## 2019-05-29 RX ORDER — ONDANSETRON 8 MG/1
4 TABLET, FILM COATED ORAL ONCE
Refills: 0 | Status: DISCONTINUED | OUTPATIENT
Start: 2019-05-29 | End: 2019-05-29

## 2019-05-29 RX ORDER — HYDROMORPHONE HYDROCHLORIDE 2 MG/ML
0.5 INJECTION INTRAMUSCULAR; INTRAVENOUS; SUBCUTANEOUS
Refills: 0 | Status: DISCONTINUED | OUTPATIENT
Start: 2019-05-29 | End: 2019-05-29

## 2019-05-29 RX ORDER — ACETAMINOPHEN 500 MG
1000 TABLET ORAL ONCE
Refills: 0 | Status: DISCONTINUED | OUTPATIENT
Start: 2019-05-30 | End: 2019-05-30

## 2019-05-29 RX ORDER — PIPERACILLIN AND TAZOBACTAM 4; .5 G/20ML; G/20ML
3.38 INJECTION, POWDER, LYOPHILIZED, FOR SOLUTION INTRAVENOUS ONCE
Refills: 0 | Status: COMPLETED | OUTPATIENT
Start: 2019-05-29 | End: 2019-05-29

## 2019-05-29 RX ORDER — ACETAMINOPHEN 500 MG
1000 TABLET ORAL ONCE
Refills: 0 | Status: COMPLETED | OUTPATIENT
Start: 2019-05-30 | End: 2019-05-30

## 2019-05-29 RX ORDER — OXYCODONE HYDROCHLORIDE 5 MG/1
5 TABLET ORAL EVERY 6 HOURS
Refills: 0 | Status: DISCONTINUED | OUTPATIENT
Start: 2019-05-29 | End: 2019-05-30

## 2019-05-29 RX ORDER — SODIUM CHLORIDE 9 MG/ML
1000 INJECTION, SOLUTION INTRAVENOUS
Refills: 0 | Status: DISCONTINUED | OUTPATIENT
Start: 2019-05-29 | End: 2019-05-29

## 2019-05-29 RX ORDER — ATORVASTATIN CALCIUM 80 MG/1
0 TABLET, FILM COATED ORAL
Qty: 0 | Refills: 0 | DISCHARGE

## 2019-05-29 RX ADMIN — SODIUM CHLORIDE 75 MILLILITER(S): 9 INJECTION, SOLUTION INTRAVENOUS at 19:31

## 2019-05-29 RX ADMIN — SODIUM CHLORIDE 1000 MILLILITER(S): 9 INJECTION INTRAMUSCULAR; INTRAVENOUS; SUBCUTANEOUS at 12:48

## 2019-05-29 RX ADMIN — SODIUM CHLORIDE 75 MILLILITER(S): 9 INJECTION, SOLUTION INTRAVENOUS at 21:53

## 2019-05-29 RX ADMIN — SODIUM CHLORIDE 3 MILLILITER(S): 9 INJECTION INTRAMUSCULAR; INTRAVENOUS; SUBCUTANEOUS at 12:48

## 2019-05-29 RX ADMIN — SODIUM CHLORIDE 1000 MILLILITER(S): 9 INJECTION INTRAMUSCULAR; INTRAVENOUS; SUBCUTANEOUS at 14:10

## 2019-05-29 RX ADMIN — ONDANSETRON 4 MILLIGRAM(S): 8 TABLET, FILM COATED ORAL at 12:46

## 2019-05-29 RX ADMIN — PIPERACILLIN AND TAZOBACTAM 200 GRAM(S): 4; .5 INJECTION, POWDER, LYOPHILIZED, FOR SOLUTION INTRAVENOUS at 16:10

## 2019-05-29 RX ADMIN — MORPHINE SULFATE 4 MILLIGRAM(S): 50 CAPSULE, EXTENDED RELEASE ORAL at 13:10

## 2019-05-29 RX ADMIN — MORPHINE SULFATE 4 MILLIGRAM(S): 50 CAPSULE, EXTENDED RELEASE ORAL at 12:46

## 2019-05-29 RX ADMIN — IOHEXOL 30 MILLILITER(S): 300 INJECTION, SOLUTION INTRAVENOUS at 12:43

## 2019-05-29 NOTE — H&P ADULT - NSICDXPASTMEDICALHX_GEN_ALL_CORE_FT
PAST MEDICAL HISTORY:  Hyperlipidemia, unspecified hyperlipidemia type     No pertinent past medical history

## 2019-05-29 NOTE — ED ADULT NURSE NOTE - NSIMPLEMENTINTERV_GEN_ALL_ED
Implemented All Universal Safety Interventions:  [place] to call system. Call bell, personal items and telephone within reach. Instruct patient to call for assistance. Room bathroom lighting operational. Non-slip footwear when patient is off stretcher. Physically safe environment: no spills, clutter or unnecessary equipment. Stretcher in lowest position, wheels locked, appropriate side rails in place.

## 2019-05-29 NOTE — ED PROVIDER NOTE - OBJECTIVE STATEMENT
Pt is a 42 yo male who presents to the ED with a cc of abdominal pain.  Pt with no significant past medical history.  Reports that symptoms first began on Tuesday.  Initially pain was localized to periumbilical region and has since localized to RLQ.  He further reports loss of appetite, and associated chills.  Denies fever, N/V/D/C, CP, SOB, ext numbness or weakness.  Pt has not seen anyone for this pain prior to today.  Denies prior similar episodes.

## 2019-05-29 NOTE — H&P ADULT - HISTORY OF PRESENT ILLNESS
42 yo male with PMHx of cardiac ablations last year presented to the ED with one day h/o abdominal pain. patient reports the pain starting as generalized abdominal pain which has since localized to RLQ with decrease appetite , nausea but denies vomiting , fever ,chills or change of GI function. no recent travels has been reported     < from: CT Abdomen and Pelvis w/ Oral Cont and w/ IV Cont (05.29.19 @ 15:26) >    CT findings compatible with acute appendicitis.

## 2019-05-29 NOTE — ED ADULT NURSE REASSESSMENT NOTE - NS ED NURSE REASSESS COMMENT FT1
OR/PACU GEORGES Cao at bedside for report, plan to take pt to OR.  NPO maintained, IV zosyn initiated, preop blood work drawn and sent to lab as ordered.

## 2019-05-29 NOTE — ED PROVIDER NOTE - CLINICAL SUMMARY MEDICAL DECISION MAKING FREE TEXT BOX
Pt is a 44 yo male who presents to the ED with a cc of abdominal pain.  Pt with no significant past medical history.  Concern for appendicitis.  Will obtain screening labs, CT abd/pelvis.

## 2019-05-30 ENCOUNTER — TRANSCRIPTION ENCOUNTER (OUTPATIENT)
Age: 44
End: 2019-05-30

## 2019-05-30 VITALS
TEMPERATURE: 98 F | HEART RATE: 87 BPM | OXYGEN SATURATION: 95 % | RESPIRATION RATE: 17 BRPM | DIASTOLIC BLOOD PRESSURE: 83 MMHG | SYSTOLIC BLOOD PRESSURE: 126 MMHG

## 2019-05-30 DIAGNOSIS — K35.30 ACUTE APPENDICITIS WITH LOCALIZED PERITONITIS, WITHOUT PERFORATION OR GANGRENE: ICD-10-CM

## 2019-05-30 LAB
BASOPHILS # BLD AUTO: 0.01 K/UL — SIGNIFICANT CHANGE UP (ref 0–0.2)
BASOPHILS NFR BLD AUTO: 0.1 % — SIGNIFICANT CHANGE UP (ref 0–2)
CULTURE RESULTS: NO GROWTH — SIGNIFICANT CHANGE UP
EOSINOPHIL # BLD AUTO: 0 K/UL — SIGNIFICANT CHANGE UP (ref 0–0.5)
EOSINOPHIL NFR BLD AUTO: 0 % — SIGNIFICANT CHANGE UP (ref 0–6)
HCT VFR BLD CALC: 44.3 % — SIGNIFICANT CHANGE UP (ref 39–50)
HGB BLD-MCNC: 15.2 G/DL — SIGNIFICANT CHANGE UP (ref 13–17)
IMM GRANULOCYTES NFR BLD AUTO: 0.6 % — SIGNIFICANT CHANGE UP (ref 0–1.5)
LYMPHOCYTES # BLD AUTO: 0.9 K/UL — LOW (ref 1–3.3)
LYMPHOCYTES # BLD AUTO: 10.3 % — LOW (ref 13–44)
MCHC RBC-ENTMCNC: 31.4 PG — SIGNIFICANT CHANGE UP (ref 27–34)
MCHC RBC-ENTMCNC: 34.3 GM/DL — SIGNIFICANT CHANGE UP (ref 32–36)
MCV RBC AUTO: 91.5 FL — SIGNIFICANT CHANGE UP (ref 80–100)
MONOCYTES # BLD AUTO: 0.68 K/UL — SIGNIFICANT CHANGE UP (ref 0–0.9)
MONOCYTES NFR BLD AUTO: 7.8 % — SIGNIFICANT CHANGE UP (ref 2–14)
NEUTROPHILS # BLD AUTO: 7.06 K/UL — SIGNIFICANT CHANGE UP (ref 1.8–7.4)
NEUTROPHILS NFR BLD AUTO: 81.2 % — HIGH (ref 43–77)
NRBC # BLD: 0 /100 WBCS — SIGNIFICANT CHANGE UP (ref 0–0)
PLATELET # BLD AUTO: 172 K/UL — SIGNIFICANT CHANGE UP (ref 150–400)
RBC # BLD: 4.84 M/UL — SIGNIFICANT CHANGE UP (ref 4.2–5.8)
RBC # FLD: 12.9 % — SIGNIFICANT CHANGE UP (ref 10.3–14.5)
SPECIMEN SOURCE: SIGNIFICANT CHANGE UP
WBC # BLD: 8.7 K/UL — SIGNIFICANT CHANGE UP (ref 3.8–10.5)
WBC # FLD AUTO: 8.7 K/UL — SIGNIFICANT CHANGE UP (ref 3.8–10.5)

## 2019-05-30 PROCEDURE — 86901 BLOOD TYPING SEROLOGIC RH(D): CPT

## 2019-05-30 PROCEDURE — 85027 COMPLETE CBC AUTOMATED: CPT

## 2019-05-30 PROCEDURE — 87086 URINE CULTURE/COLONY COUNT: CPT

## 2019-05-30 PROCEDURE — 88304 TISSUE EXAM BY PATHOLOGIST: CPT

## 2019-05-30 PROCEDURE — 86850 RBC ANTIBODY SCREEN: CPT

## 2019-05-30 PROCEDURE — 36415 COLL VENOUS BLD VENIPUNCTURE: CPT

## 2019-05-30 PROCEDURE — 81001 URINALYSIS AUTO W/SCOPE: CPT

## 2019-05-30 PROCEDURE — 80053 COMPREHEN METABOLIC PANEL: CPT

## 2019-05-30 PROCEDURE — 85730 THROMBOPLASTIN TIME PARTIAL: CPT

## 2019-05-30 PROCEDURE — 99285 EMERGENCY DEPT VISIT HI MDM: CPT | Mod: 25

## 2019-05-30 PROCEDURE — 96375 TX/PRO/DX INJ NEW DRUG ADDON: CPT

## 2019-05-30 PROCEDURE — 85610 PROTHROMBIN TIME: CPT

## 2019-05-30 PROCEDURE — 74177 CT ABD & PELVIS W/CONTRAST: CPT

## 2019-05-30 PROCEDURE — 86900 BLOOD TYPING SEROLOGIC ABO: CPT

## 2019-05-30 PROCEDURE — 96374 THER/PROPH/DIAG INJ IV PUSH: CPT

## 2019-05-30 PROCEDURE — 93005 ELECTROCARDIOGRAM TRACING: CPT

## 2019-05-30 PROCEDURE — 83690 ASSAY OF LIPASE: CPT

## 2019-05-30 RX ORDER — OXYCODONE HYDROCHLORIDE 5 MG/1
1 TABLET ORAL
Qty: 5 | Refills: 0
Start: 2019-05-30

## 2019-05-30 RX ADMIN — Medication 400 MILLIGRAM(S): at 02:31

## 2019-05-30 RX ADMIN — Medication 1000 MILLIGRAM(S): at 02:34

## 2019-05-30 NOTE — PROGRESS NOTE ADULT - SUBJECTIVE AND OBJECTIVE BOX
patient seen this AM in bed in NAD, s/p laparoscopic appendectomy last night, pain well controlled, ambulating well, tolerating PO, +ve flatus.     Objective:  Vital Signs Last 24 Hrs  T(C): 36.5 (30 May 2019 04:25), Max: 37.6 (29 May 2019 16:46)  T(F): 97.7 (30 May 2019 04:25), Max: 99.7 (29 May 2019 16:46)  HR: 71 (30 May 2019 04:25) (71 - 108)  BP: 104/66 (30 May 2019 04:25) (104/66 - 146/85)  BP(mean): --  RR: 16 (30 May 2019 04:25) (14 - 19)  SpO2: 99% (30 May 2019 04:25) (95% - 99%)      05-29-19 @ 07:01  -  05-30-19 @ 07:00  --------------------------------------------------------  IN: 315 mL / OUT: 1225 mL / NET: -910 mL                                16.8   7.05  )-----------( 175      ( 29 May 2019 12:35 )             48.6       05-29    136  |  102  |  13  ----------------------------<  115<H>  3.8   |  29  |  1.10    Ca    8.8      29 May 2019 12:35    TPro  8.1  /  Alb  4.4  /  TBili  1.6<H>  /  DBili  x   /  AST  25  /  ALT  38  /  AlkPhos  76  05-29      Plan: discharge and f/u in 1 wk patient seen this AM in bed in NAD, s/p laparoscopic appendectomy last night, pain well controlled, ambulating well, tolerating PO, +ve flatus. denies any sob , N/V, CP  abdomin soft mild incisional tenderness, incisions intact, +ve BS  lungs , CTA  LE; no calf tenderness     Objective:  Vital Signs Last 24 Hrs  T(C): 36.5 (30 May 2019 04:25), Max: 37.6 (29 May 2019 16:46)  T(F): 97.7 (30 May 2019 04:25), Max: 99.7 (29 May 2019 16:46)  HR: 71 (30 May 2019 04:25) (71 - 108)  BP: 104/66 (30 May 2019 04:25) (104/66 - 146/85)  BP(mean): --  RR: 16 (30 May 2019 04:25) (14 - 19)  SpO2: 99% (30 May 2019 04:25) (95% - 99%)      05-29-19 @ 07:01  -  05-30-19 @ 07:00  --------------------------------------------------------  IN: 315 mL / OUT: 1225 mL / NET: -910 mL                                16.8   7.05  )-----------( 175      ( 29 May 2019 12:35 )             48.6       05-29    136  |  102  |  13  ----------------------------<  115<H>  3.8   |  29  |  1.10    Ca    8.8      29 May 2019 12:35    TPro  8.1  /  Alb  4.4  /  TBili  1.6<H>  /  DBili  x   /  AST  25  /  ALT  38  /  AlkPhos  76  05-29      Plan: discharge and f/u in 1 wk

## 2019-05-30 NOTE — DISCHARGE NOTE PROVIDER - NSDCCPCAREPLAN_GEN_ALL_CORE_FT
PRINCIPAL DISCHARGE DIAGNOSIS  Diagnosis: Appendicitis  Assessment and Plan of Treatment:       SECONDARY DISCHARGE DIAGNOSES  Diagnosis: Abdominal pain  Assessment and Plan of Treatment:

## 2019-05-30 NOTE — DISCHARGE NOTE PROVIDER - NSDCACTIVITY_GEN_ALL_CORE
Showering allowed/Do not drive or operate machinery/No heavy lifting/straining/Walking - Outdoors allowed/Do not make important decisions/Stairs allowed/Walking - Indoors allowed

## 2019-05-30 NOTE — PROGRESS NOTE ADULT - SUBJECTIVE AND OBJECTIVE BOX
The patient was interviewed and evaluated. Alex present. Being discharged home  43y Male    T(C): 36.5 (05-30-19 @ 07:45), Max: 37.6 (05-29-19 @ 16:46)  HR: 87 (05-30-19 @ 07:45) (71 - 108)  BP: 126/83 (05-30-19 @ 07:45) (104/66 - 146/85)  RR: 17 (05-30-19 @ 07:45) (14 - 19)  SpO2: 95% (05-30-19 @ 07:45) (95% - 99%)  Wt(kg): --    Pt seen, doing well, no anesthesia complications or complaints noted or reported.   No Nausea    All questions answered    No additional recommendations.     Pain well controlled

## 2019-05-30 NOTE — DISCHARGE NOTE NURSING/CASE MANAGEMENT/SOCIAL WORK - NSDCDPATPORTLINK_GEN_ALL_CORE
You can access the [email or web address] Patient Portal, offered by [facility], by registering with the following website: [email or web address]

## 2019-05-30 NOTE — DISCHARGE NOTE PROVIDER - HOSPITAL COURSE
42 yo male presented to the ED department last night with abdominal pain x1 day. patient was diagnosed with acute appendicitis and underwent     laparoscopic appendectomy. patient tolerated the procedure well with no intra-op, post op complications. patient for discharge and follow up at the office

## 2019-05-30 NOTE — DISCHARGE NOTE PROVIDER - CARE PROVIDER_API CALL
Mark Mohan)  Surgery  [address], [address] [place] [id number]  Phone: [phone]  Fax: [phone]  Follow Up Time: 1 week

## 2019-06-03 PROBLEM — Z78.9 OTHER SPECIFIED HEALTH STATUS: Chronic | Status: ACTIVE | Noted: 2019-05-29

## 2019-06-06 ENCOUNTER — APPOINTMENT (OUTPATIENT)
Dept: SURGERY | Facility: CLINIC | Age: 44
End: 2019-06-06
Payer: COMMERCIAL

## 2019-06-06 VITALS
OXYGEN SATURATION: 98 % | SYSTOLIC BLOOD PRESSURE: 125 MMHG | DIASTOLIC BLOOD PRESSURE: 83 MMHG | HEART RATE: 88 BPM | RESPIRATION RATE: 14 BRPM | BODY MASS INDEX: 26.52 KG/M2 | HEIGHT: 68 IN | WEIGHT: 175 LBS

## 2019-06-06 PROCEDURE — 99024 POSTOP FOLLOW-UP VISIT: CPT

## 2019-11-11 NOTE — PROGRESS NOTE ADULT - SUBJECTIVE AND OBJECTIVE BOX
Patient is a 43y old  Male who presents with a chief complaint of chest pain (27 Aug 2018 14:37)      INTERVAL HPI/OVERNIGHT EVENTS:  T(C): 36.4 (08-29-18 @ 14:55), Max: 36.8 (08-28-18 @ 21:08)  HR: 74 (08-29-18 @ 14:55) (59 - 76)  BP: 94/70 (08-29-18 @ 14:55) (94/70 - 121/77)  RR: 17 (08-29-18 @ 14:55) (16 - 17)  SpO2: 97% (08-29-18 @ 14:55) (97% - 99%)  Wt(kg): --  I&O's Summary    28 Aug 2018 07:01  -  29 Aug 2018 07:00  --------------------------------------------------------  IN: 740 mL / OUT: 1650 mL / NET: -910 mL    29 Aug 2018 07:01  -  29 Aug 2018 19:04  --------------------------------------------------------  IN: 240 mL / OUT: 0 mL / NET: 240 mL        LABS:                        17.0   6.7   )-----------( 196      ( 29 Aug 2018 04:13 )             49.6     08-29    139  |  104  |  16  ----------------------------<  114<H>  4.1   |  23  |  1.02    Ca    9.1      29 Aug 2018 04:13  Phos  3.9     08-28  Mg     2.1     08-28    TPro  6.8  /  Alb  4.4  /  TBili  0.6  /  DBili  x   /  AST  21  /  ALT  29  /  AlkPhos  56  08-28        CAPILLARY BLOOD GLUCOSE                MEDICATIONS  (STANDING):  atorvastatin 40 milliGRAM(s) Oral at bedtime  enoxaparin Injectable 40 milliGRAM(s) SubCutaneous every 24 hours  metoprolol succinate ER 25 milliGRAM(s) Oral daily    MEDICATIONS  (PRN):  ondansetron Injectable 4 milliGRAM(s) IV Push every 6 hours PRN Nausea          PHYSICAL EXAM:  GENERAL: NAD, well-groomed, well-developed  HEAD:  Atraumatic, Normocephalic  CHEST/LUNG: Clear to percussion bilaterally; No rales, rhonchi, wheezing, or rubs  HEART: Regular rate and rhythm; No murmurs, rubs, or gallops  ABDOMEN: Soft, Nontender, Nondistended; Bowel sounds present  EXTREMITIES:  2+ Peripheral Pulses, No clubbing, cyanosis, or edema  LYMPH: No lymphadenopathy noted  SKIN: No rashes or lesions    Care Discussed with Consultants/Other Providers [ ] YES  [ ] NO decreased activity level/inability to enjoy life

## 2020-03-11 NOTE — DISCHARGE NOTE PROVIDER - NS AS DC PROVIDER CONTACT Y/N MULTI
Likely due to acute ostitis media, associated with nausea, vomiting, and gait instability x1 day  Was given Valium 5 mg po x1 in ED with improvement in symptoms  · Will prescribe Meclizine 25 mg q8h p r n    · PT eval appreciated- patient will need outpatient balance center Yes

## 2021-12-03 NOTE — PATIENT PROFILE ADULT - BRADEN SENSORY
[FreeTextEntry1] : I, Angy Rivas, acted solely as a scribe for Dr. Octaviano Ching on 12/03/2021. All medical entries made by the Scribe were at my, Dr. Octaviano Ching's, direction and personally dictated by me on 12/03/2021. I have reviewed the chart and agree that the record accurately reflects my personal performance of the history, physical exam, assessment and plan. I have also personally directed, reviewed, and agreed with the chart.  (4) no impairment

## 2022-03-01 NOTE — ED PROVIDER NOTE - OBJECTIVE STATEMENT
This patient has been assessed with a concern for Malnutrition and has been determined to have a diagnosis/diagnoses of Severe protein-calorie malnutrition.    This patient is being managed with:   Diet NPO with Tube Feed-  Tube Feeding Modality: Gastrostomy  2Kal HN  Total Volume for 24 Hours (mL): 840  Continuous  Starting Tube Feed Rate {mL per Hour}: 10  Increase Tube Feed Rate by (mL): 10     Every 3 hours  Until Goal Tube Feed Rate (mL per Hour): 35  Tube Feed Duration (in Hours): 24  Tube Feed Start Time: 19:00  No Carb Prosource TF     Qty per Day:  1  Entered: Feb 25 2022  6:40PM    Diet NPO with Tube Feed-  Tube Feeding Modality: Gastrostomy  Glucerna 1.5 Dhruv  Total Volume for 24 Hours (mL): 960  Continuous  Starting Tube Feed Rate {mL per Hour}: 30  Increase Tube Feed Rate by (mL): 10     Every 6 hours  Until Goal Tube Feed Rate (mL per Hour): 40  Tube Feed Duration (in Hours): 24  Tube Feed Start Time: 11:00  No Carb Prosource TF     Qty per Day:  1 pack /day  Entered: Feb 22 2022 10:57AM    The following pending diet order is being considered for treatment of Severe protein-calorie malnutrition:null 44 y/o M pt w/ PMH of HLD presenting to the ED w/ a complaint of chest pain. Pt sent in by his cardiologist Dr. Ed Riley. Pt stated that approximately 2 weeks ago he began to have a fluttering feeling in his chest. Reports increased coughing with the feeling, but denies shortness of breath, dizziness, nausea/vomiting. Saw cardiologist last Monday who placed him on 24 hr halter monitor. Was prescribed metoprolol 25 mg once a day on Friday which he has been taking. Was told this morning by cardiologist that he should come to the ED for further evaluation. Pt reports cardiologist told him his "heart was skipping a beat." Pt currently symptom free. When he does get the feeling he says it starts on the L side of his chest and radiates to the right and down to his upper stomach. Reports stressful job and increased stress at home due to pt's fiance's mother moving in 3 months ago. No additional complaints at this time. 44 y/o M pt w/ PMH of HLD presenting to the ED w/ a complaint of chest pain. Pt sent in by his cardiologist Dr. Ed Riley. Pt stated that approximately 2 weeks ago he began to have a fluttering feeling in his chest that comes and goes. Reports increased coughing with the feeling, but denies shortness of breath, dizziness, nausea/vomiting. Saw cardiologist last Monday who placed him on 24 hr halter monitor. Was prescribed metoprolol 25 mg once a day on Friday which he has been taking. Was told this morning by cardiologist that he should come to the ED for further evaluation. Pt reports cardiologist told him his "heart was skipping a beat." Reports last feeling the fluttering yesterday and is currently symptom free. When he does get the feeling he says it starts on the L side of his chest and radiates to the right and down to his upper stomach. Reports stressful job and increased stress at home due to pt's fiance's mother moving in 3 months ago. No additional complaints at this time.

## 2022-04-29 NOTE — CONSULT NOTE ADULT - ATTENDING COMMENTS
[facility] MEDICINE PROGRESS NOTE   Patient: Vannessa Perez  Today's Date: 2022    YOB: 1940  Admission Date: 2022    MRN: [id number]  Inpatient LOS: 8    Room: 105/  Hospital Day: Hospital Day: 11    Subjective   HISTORY AND SUBJECTIVE COMPLAINTS     Chief Complaint:       Interval History / Subjective:       Hospital Course:  Vannessa Perez is a 82 year old female who presented on 2022 with complaints of Breathing Problem  .  No acute overnight events.  Today is her baseline AHI she is feeding very good.  Was able to disconnect her from OptiFlow was placed on high-flow.  Creatinine is slightly up trending.  Her hemoglobin remained stable.  Had 1 episode of hemoptysis which was showing minimal.  Will continue monitoring closely    ROS:  Pertinent systems negative except as above.    Objective   PHYSICAL EXAMINATION     Vital 24 Hour Range Most Recent Value   Temperature Temp  Min: 98.5 °F (36.9 °C)  Max: 99.8 °F (37.7 °C) 98.5 °F (36.9 °C)   Pulse Pulse  Min: 60  Max: 71 60   Respiratory Resp  Min: 16  Max: 36 (!) 23   Blood Pressure BP  Min: 115/57  Max: 169/73 (!) 148/66   Pulse Oximetry SpO2  Min: 88 %  Max: 100 % 95 %   Arterial BP No data recorded     O2 O2 Flow Rate (L/min)  Av.7 L/min  Min: 4 L/min   Min taken time: 22 1300  Max: 60 L/min   Max taken time: 22 0739       Recorded Intake and Output:    Intake/Output Summary (Last 24 hours) at 2022  Last data filed at 2022 1215  Gross per 24 hour   Intake 426 ml   Output 2225 ml   Net -1799 ml      Recorded Last Stool Occurrence:       Vital Most Recent Value First Value   Weight 119.4 kg (263 lb 3.7 oz) Weight: 126.1 kg (278 lb)   Height 5' 6\" (167.6 cm) Height: 5' 6\" (167.6 cm)   BMI 42.49 N/A     General: Looks acutely ill respiratory distress, is on OptiFlow  CV: regular rate and rhythm  Resp: crackles noted bilateral bases  Abd: soft, nontender, nondistended, no  hepatosplenomegaly and bowel sounds  present  Ext: {edema:  2+ in legs going up to thighs  Skin: no rashes, lesions, or ulcers noted  Neuro: no focal deficits noted  Psych: normal judgement and insight  TEST RESULTS     Labs: The Laboratory values listed below have been reviewed and pertinent findings discussed in the Assessment and Plan.    Laboratory values:   Recent Labs   Lab 04/28/22  0329 04/27/22  0158 04/26/22  0446   WBC 12.5* 11.2* 9.7   HGB 9.4* 9.4* 9.1*   HCT 29.2* 28.9* 28.5*    179 189       Recent Labs   Lab 04/28/22  1243 04/28/22  0329 04/27/22  1941 04/27/22  1301 04/27/22  0158 04/25/22  1239 04/25/22  0430   SODIUM  --  142  --   --  144  --  146*   POTASSIUM 3.4 3.3* 3.3*   < > 3.6  3.5   < > 2.9*  2.9*   CHLORIDE  --  98  --   --  98  --  101   CO2  --  38*  --   --  39*  --  42*   CALCIUM  --  8.7  --   --  9.1  --  9.2   GLUCOSE  --  175*  --   --  129*  --  144*   BUN  --  85*  --   --  84*  --  78*   CREATININE  --  1.67*  --   --  1.55*  --  1.36*    < > = values in this interval not displayed.          Radiology: Imaging studies have been reviewed and pertinent findings discussed in the Assessment and Plan.  No results found for any visits on 04/18/22 (from the past 48 hour(s)).     ANCILLARY ORDERS     Diet:  Consistent Carb Moderate (45-75 Gm/meal), Fluid Restrict 1500ml (900 From Dietary), Dysphagia/mech Soft Diet  Telemetry: On  Consults:    IP CONSULT TO CARDIOLOGY  IP CONSULT TO CASE MANAGEMENT  IP CONSULT TO CARDIOLOGY  IP CONSULT TO PULMONOLOGY  IP CONSULT TO ORTHOPEDIC SURGERY  Therapy Orders:   PT and OT Orders Placed this Encounter   Procedures   • Occupational Therapy   • Physical Therapy       ADVANCED DIRECTIVES     Code Status: Do Not Resuscitate         ASSESSMENT AND PLAN       Hemoptysis     -Overnight had increasing amount of blood coming from her nose and mouth and seems to have hemoptysis.  Was moved to ICU for closer monitoring.  Received a dose of  tranexamic acid.  This morning bleeding seems to be control and does not have any bloody cough or bleeding from her nose.  Hemoglobin remains stable  - Was evaluated by pulmonology who recommends bronchoscopy but the patient will need to be intubated for bronchoscopy.  Patient is adamantly refuses intubation.  She understands the consequences and is also considering comfort care.  I had a very detailed discussion with the patient and her family at bedside about the situation. They verbalized understanding     4/26> Overnight patient had couple of episodes of hemoptysis.  Received a dose of tranexamic acid which decrease the bleeding but still has bleeding coming up with cough.  Had a detailed discussion with the patient about the situation.  She still adamantly refuses to have bronchoscopy and intubation.  She wants to try this for a day or 2 and if bleeding continues and her situation gets worse, she will proceed with comfort care.  I had a detailed discussion with many of her map family members in the waiting room and explained the situation to them.  Will continue medical management with holding any anticoagulation and using tranexamic acid as needed.  Also considering patient has pulmonary edema, will continue Bumex drip as well as respiratory support with OptiFlow     4/28>  Her hemoglobin remained stable.  Had 1 episode of hemoptysis which was showing minimal.  Will continue monitoring closely             Acute on chronic diastolic heart failure, pulmonary hypertension exacerbation, moderate aortic stenosis with valve area 1.2 cm2        Echo shows RVSP of 74 mm Hg aortic valve area is 1.2 cm2, grade 3 diastolic dysfunction  Pulmonary hypertension most likely due to underlying COPD, untreated sleep apnea  Daily weights, strict intake and output  2 g sodium, 1500 mL fluid restriction diet  Switched to Bumex bolus and drip   Echo  with markedly elevated filling pressures  Bumex  drip increased to 2  milligram/hour  4/22:  Creatinine is stable, continue aggressive diuresis, ongoing cardiology input noted who advise evaluating for underlying pulmonary embolism, empirical anticoagulation until then  Nocturnal CPAP if tolerates    3 times a week metolazone  4/23:  Continue Bumex and metolazone, increase nitrate as blood pressure tolerates, continue hydralazine, fluid restriction  CT angiogram planned on Monday after discussion with the patient, patient unable lie flat for V/Q scan, last CT pulmonary angiogram in June 21 was negative  4/24:  Diuresis with Bumex and metolazone, CT angio at some point once creatinine is stable and the patient is being given a diuretic holiday to reduce the risk of contrast nephropathy, patient declined V/Q scan, was given CPAP trial overnight which did not tolerate, creatinine and urea are slowly rising as expected, patient remains on Bumex infusion, b.i.d. albumin added, ongoing cardiology input     4/27> He had a small episode of hemoptysis this morning.  Hemoglobin is remaining stable.  Overall feels better.  Will continue current management.    4/28>Today is her baseline AHI she is feeding very good.  Was able to disconnect her from OptiFlow was placed on high-flow.  Creatinine is slightly up trending.        Acute on chronic hypoxic respiratory failure  This is secondary to exacerbation of congestive heart failure  IV diuretics, supplemental oxygen  Oxygen to be weaned off as tolerated  On 3 liters of O2 initially and then needing 6 L  Target saturations 80-92% in view of underlying chronic lung issues  4-5 L oxygen requirement, continue incentive spirometry, CPAP trial at night in view of presumed sleep apnea, BMI is 44        Elevated troponin, type 2 per Cardiology  EKG with paced rhythm.  Patient denies any chest pain  ?  Demand ischemia from congestive heart failure versus acute coronary syndrome versus from pulmonary embolism  D-dimer elevated at 3.58  Patient has contrast  allergy and creatinine of 1.3 with GFR of 38  VQ scan ordered but patient could not tolerate it as she could not lay flat  Duplex ultrasound of both legs negative for DVT  Placed on therapeutic dose of Lovenox , given 325 mg of aspirin, continued on Plavix and Lipitor along with Coreg  2D echo done with EF of 60-65% with no regional wall motion abnormalities.  Right ventricular systolic pressure measuring 74 mm, severely elevated pulmonary pressure.  Grade 3 diastolic dysfunction with elevated filling pressures  Cardiology consulted and case discussed with Dr. Vilchis  4/22:  Patient was given empirical anticoagulation earlier which was stopped because of nosebleed, now restarted after discussion with Cardiology, continue to optimize medical management, patient refused V/Q scan as she cannot lie flat, will attempt CT pulmonary angiogram once creatinine is stable, risk of acute kidney injury due to ongoing diuretic use        Symptomatic UTI  Urine culture growing E.Coli resistant to cipro and ampicillin  On  IV ceftriaxone and then switched to Omnicef        Type 2 diabetes mellitus  Diabetic diet and sliding scale coverage with Humalog  On Lantus 26 units b.i.d.  Blood sugars appear uncontrolled  4/23:  Continue to adjust Lantus and lispro based on bedside blood glucose, currently on IV steroids           CKD stage 3  Creatinine monitored and nephrotoxic medications avoided  Mean arterial pressure to be kept above 65  Creatinine is trending down, continue diuretics, reduce nephrotoxic use        Acute flare-up of gout in right knee      Patient has underlying joint replacement  Uric acid was elevated at 10  Started oral prednisone but patient had no improvement and it was stopped  4/22:  Started on b.i.d. Solu-Medrol as clinical picture was consistent with acute gout, cannot use nonsteroidal in view of CKD, patient will need a repeat around once the acute gout episodes settle  Diuretics would worsen gout flare up but  she needed clinically because of diastolic heart failure and pulmonary hypertension exacerbation, this was discussed with the patient  4/23:  Right knee pain is better after starting IV steroids, continue  4/24:  Right knee pain is better, diuretic use is likely contributing to gout flare up but cannot be stopped because of worsening heart failure symptoms, this was discussed with the patient, monitor for steroid related hyperglycemia     Epistaxis     Patient was given empirical Lovenox earlier because of troponin elevation and D-dimer elevation pending a PE exclusion  Given Afrin spray, vaseline , saline nose spray  4/22:  Rapid rhino if needed, humidify oxygen delivery system, restarted on b.i.d. Lovenox after appropriate counseling and discussion, Cardiology advise empirical anticoagulation until pulmonary embolism is ruled out, will request CT pulmonary angiogram once creatinine is stable  4/23:  No further epistaxis, humidified oxygen delivery system, continue dual antiplatelet therapy and b.i.d. Lovenox as discussed with Cardiology  4/24: Hb stable, continue Lovenox, if any further bleed will stop, humidified O2, patient is also on DAPT     Hypokalemia  Supplemented.        Goals of Care     DNR noted  End stage PHT, Hospice discussed, patient not ready yet  Continue medical  Care  Poor Prognosis discussed with patient, RN Mila Cheney           Smoking status: former smoker    Nutrition status: appropriate  Body mass index is 43.91 kg/m². - Morbid obesity (BMI >40 or 35+ and a comorbid condition)  DVT Prophylaxis: Lovenox therapeutic dosing (dose adjusted as per renal function)        DISCHARGE PLANNING      The patient's treatment plans were discussed with patient, RN and .       Recommendations for Discharge   SW     PT Sub-acute nursing home   OT Home therapy, Sub-acute nursing home   SLP        Anticipated discharge destination:  Home with home health versus skilled nursing home  placement  Expected Discharge Date: 2-3 days  Barriers to Discharge: Patient is not medically ready and needs to remain in the hospital today due to dyspnea and CHF                  Behnam Hajihossainlou, MD  Hospitalist  4/28/2022  7:42 PM     H/P as per fellows note  VT very irregualr C/W automatic focus. PVC on ECG C/W RVOT or cusp origin.  Need to R/O structural; heart disease. Therefore plan for ECHO and Cath

## 2022-11-04 NOTE — ED ADULT NURSE NOTE - CAS TRG GEN SKIN COLOR
"Patient is seen at bedside to sahra and treat reported "boils" to right posterior ear, and right temporal scalp with associated periorbital edema. CT showed on facial soft tissue swelling. Pt reports purulent drainage when he "popped" the lesion behind his ear. Fluctuance noted to temporal boil was noted with a scab that was unroofed with significant purulence, wound culture was obtained. All purulence was expressed the area was soaked with Vashe then mupirocin applied and ordered daily. Periorbital edema is concerning in conjunction with purulence in close proximity. Pt will most likely need continued IV antibiotics and monitoring. I will make a wound care clinic follow up for next week.                   " Normal for race

## 2023-01-01 NOTE — H&P ADULT - PROBLEM SELECTOR PLAN 1
Patient Brennan Caballero Age 6 week old Sex male   MRN [id number] Encounter Date 2023      Chief Complaint    Chief Complaint   Patient presents with    Testicle Swelling       History of Present Illness  5-week-old infant was born per  section at 38 weeks.  Mother was followed through her pregnancy for SGA.  At delivery the baby was active and alert and has grown well.  Child was born 6 lb 5 oz and now is 9 lb 3 oz. the child has had no vomiting, diarrhea, skin lesions rash.  Just 2 hours prior to presentation parents noted a distinct bulge in the right inguinal area    Medical History    PAST MEDICAL HISTORY:  History reviewed. No pertinent past medical history.    PAST SURGICAL HISTORY:  History reviewed. No pertinent surgical history.    MEDICATIONS:     Medication List        Prior to Admission Medications        Sig   VITAMIN D (ERGOCALCIFEROL) PO   Oral                Allergies:    ALLERGIES:  No Known Allergies    SOCIAL HISTORY:  Social History     Tobacco Use    Smoking status: Never   Substance Use Topics    Drug use: Never       REVIEW OF SYSTEMS:  Please refer to the above history of present illness regarding all pertinent positives and negatives. All other systems were reviewed and are negative.    Physical Exam    ED Triage Vitals   ED Triage Vitals Group      Temp 23 1249 98 °F (36.7 °C)      Heart Rate 23 1254 150      Resp 23 1254 39      BP --       SpO2 23 1254 97 %      EtCO2 mmHg --       Height --       Weight 23 1249 9 lb 3 oz (4.167 kg)      Weight Scale Used 23 1249 Infant scale      BMI (Calculated) --       IBW/kg (Calculated) --      Vitals:    23 1249 23 1254   Pulse:  150   Resp:  39   Temp: 98 °F (36.7 °C) 98 °F (36.7 °C)   TempSrc: Oral Rectal   SpO2:  97%   Weight: 4.167 kg (9 lb 3 oz) 4.167 kg (9 lb 3 oz)     Physical Exam  Constitutional:       General: He is active. He has a strong cry. He is not in acute distress.  HENT:       Head: Anterior fontanelle is flat.      Right Ear: Tympanic membrane normal.      Left Ear: Tympanic membrane normal.      Nose: Nose normal.      Mouth/Throat:      Mouth: Mucous membranes are moist.      Pharynx: Oropharynx is clear.   Eyes:      General:         Right eye: No discharge.         Left eye: No discharge.      Conjunctiva/sclera: Conjunctivae normal.      Pupils: Pupils are equal, round, and reactive to light.   Cardiovascular:      Rate and Rhythm: Normal rate and regular rhythm.      Heart sounds: S1 normal and S2 normal. No murmur heard.  Pulmonary:      Effort: Pulmonary effort is normal. No respiratory distress, nasal flaring or retractions.      Breath sounds: Normal breath sounds. No wheezing or rhonchi.   Abdominal:      General: There is no distension.      Palpations: Abdomen is soft. There is no mass.      Tenderness: There is no abdominal tenderness. There is no guarding.   Genitourinary:     Penis: Normal.       Testes: Normal.      Comments: In the right side, the right inguinal canal area there is a distinct bulge which is palpable and visible.  The testicle is palpable in the scrotal sac  Musculoskeletal:         General: No deformity. Normal range of motion.      Cervical back: Normal range of motion and neck supple. No rigidity.   Lymphadenopathy:      Cervical: No cervical adenopathy.   Skin:     General: Skin is warm and dry.      Coloration: Skin is not jaundiced.      Findings: No rash.   Neurological:      Mental Status: He is alert.      Sensory: No sensory deficit.      Motor: No abnormal muscle tone.      Primitive Reflexes: Suck normal. Primitive reflexes normal.       Diagnostic Studies    LABORATORY STUDIES:  Labs Reviewed - No data to display    IMAGING STUDIES:  Imaging Results              US SOFT TISSUE GROIN RIGHT (Final result)  Result time 12/09/23 14:23:00   Procedure changed from US TESTICLES AND SCROTUM WITH DUPLEX      Final result                   Impression:     IMPRESSION:  1. Reducible hernia in the right inguinal canal  2. The right testis moved in and out of the inguinal canal during the exam    Electronically Signed by: Epifanio Pretty MD  Signed on: 2023 2:23 PM  Created on Workstation ID: [id number]  Signed on Workstation ID: [id number]               Narrative:    EXAM:US SOFT TISSUE GROIN RIGHT    INDICATION: rt inguinal hernia    COMPARISON: None    TECHNIQUE: Right inguinal canal with grayscale and and color Doppler .    FINDINGS: Fluid in mobile bowel consistent with hernia in the right  inguinal canal. This is reducible. The right testis moved in and out of the  inguinal canal during the exam.                                    Ultrasound findings are reviewed with the technician revealing tissue in the inguinal canal consistent with reducible hernia    Procedures    ED Course:   The hernia was reduced with gentle pressure  Medical Decision Making  6-week-old presents emergency department with an inguinal hernia.  This was reduced.  The case was thoroughly discussed with physician on-call at [facility].  I spoke with the Resident physician who reviewed the case with his attending Dr Justice.  She advised follow-up in outpatient clinic.  [facility] will be contacting the parents on Monday to set up a clinic appointment.  The parents were advised to follow-up immediately if the hernia recurs, any vomiting or fever    Discharge Diagnosis    ED Diagnosis       Diagnosis Comment Associated Orders       Final diagnosis    Unilateral inguinal hernia without obstruction or gangrene, recurrence not specified -- SERVICE TO PEDIATRIC SURGERY                             Alexandro Duran MD  12/13/23 7212     telemonitor   ro ACS  EP fu  management as per cards

## 2024-12-30 NOTE — ED ADULT NURSE NOTE - NS ED NURSE RECORD ANOTHER VITAL SIGN
Name: Toro Rodriguez      : 1949      MRN: [id number]  Encounter Provider: Rick Espino MD  Encounter Date: 2025   Encounter department: [facility] UROLOGY [place]  :  Assessment & Plan  Malignant neoplasm of overlapping sites of bladder (HCC)  Muscle invasive bladder cancer with small cell carcinoma  He is not a candidate for radical cystectomy in my opinion  He is limited for performance status due to his medical comorbid conditions  He is doing better relative to when I last saw him in the hospital setting for clot evacuation and fulguration bleeding vessels and transurethral resection of bladder tumor.  I did review with him and with his daughter today in the cardiology of his urologic workup.  Of note, he did not have a tumor in July of this year when cystoscopy was performed.  It appeared that he had bleeding from his suprapubic catheter and this was then shown to be due to a bladder tumor at the trigone.    Goals of care are for aggressive management at this time.  He has seen hematology/oncology  Have referred him to radiation oncology for consideration of the addition of radiation for trimodality therapy of his aggressive bladder cancer.    I will plan to perform cystoscopy by way of a suprapubic catheter to assess his disease burden at roughly 3 months from now.    Should he have worsening bleeding in the future we can offer him a palliative transurethral resection of bladder tumor if this is causing him bleeding.      Orders:    Ambulatory Referral to Radiation Oncology; Future    Benign hypertension with chronic kidney disease, stage III (HCC)  Lab Results   Component Value Date    EGFR 34 2024    EGFR 31 2024    EGFR 30 2024    CREATININE 1.85 (H) 2024    CREATININE 2.02 (H) 2024    CREATININE 2.04 (H) 2024   Likely due to medical comorbid conditions as well as obstruction of the right renal unit from his tumor.  He has been decompressed  with a nephrostomy tube.    We can consider conversion to nephroureteral catheter at his next exchange with the eventual plan for a conversion to an internal double-J ureteral stent if this is physically possible         Dilated cardiomyopathy (HCC)  This competing comorbid condition does favor a trimodality approach versus trying to get the patient through a radical cystectomy       Type 2 diabetes mellitus with stage 3b chronic kidney disease, without long-term current use of insulin (HCC)    Lab Results   Component Value Date    HGBA1C 5.9 (H) 04/18/2024   Increased risk of infectious complication with operative intervention         Coronary artery disease involving native coronary artery of native heart without angina pectoris         Benign prostatic hyperplasia with urinary retention  Managed with a suprapubic catheter at this time  He can return for suprapubic catheter exchange in roughly 4 weeks with our nursing staff.  This can then be changed every 6 weeks  Discontinue tamsulosin and proscar       Hydronephrosis with ureteropelvic junction (UPJ) obstruction  His hydronephrosis is likely due to his tumor.  This is managed with a nephrostomy tube at this time       Encounter to discuss test results  I have spent a total time of 45 minutes in caring for this patient on the day of the visit/encounter including Diagnostic results, Prognosis, Risks and benefits of tx options, Instructions for management, Patient and family education, Importance of tx compliance, Risk factor reductions, Impressions, Counseling / Coordination of care, Documenting in the medical record, Reviewing / ordering tests, medicine, procedures  , and Obtaining or reviewing history  .        Nephrostomy status (HCC)  Currently with a nephrostomy tube in place  At his next exchange I would recommend consideration of conversion to a percutaneous nephroureteral catheter with eventual plan for internal double-J ureteral stent which can be  "changed every 3 to 6 months in the operating room by way of a retrograde approach if this is successful.    I did tell him to expect that his nephrostomy tube may be a permanent renal unit management strategy given his cystoscopic findings at the time of cystoscopy with clot evacuation and fulguration of bleeding vessels and TURBT         Print pathology - invasive high grade small cell carcinoma  Refer to med onc and rad onc    History of Present Illness   Toro Rodriguez is a 75 y.o. male who presents s/p clot evacuation and fulguration of bleeding vessels which showed a invasive high-grade appearing tumor.  Final pathology shows invasive high-grade neuroendocrine tumor, small cell carcinoma invasion of the muscularis propria is present.  He is 75 with multiple medical comorbid conditions and not healthy enough for consideration of radical cystectomy.    I spoke with him about the importance of a discussion of the goals of care as well as potential for radiation therapy and evaluation by medical oncology for trimodality therapy going forward.    All questions and concerns answered and addressed.    The following portions of the patient's history were reviewed and updated as appropriate: allergies, current medications, past family history, past medical history, past social history, past surgical history and problem list.    Review of Systems   Constitutional: Negative.    HENT: Negative.     Eyes: Negative.    Respiratory: Negative.     Cardiovascular: Negative.    Gastrointestinal: Negative.    Endocrine: Negative.    Genitourinary: Negative.    Musculoskeletal: Negative.    Skin: Negative.    Allergic/Immunologic: Negative.    Neurological: Negative.    Hematological: Negative.    Psychiatric/Behavioral: Negative.            Objective   /62 (BP Location: Left arm, Patient Position: Sitting, Cuff Size: Standard)   Pulse 87   Ht 5' 7\" (1.702 m)   Wt 85.3 kg (188 lb)   SpO2 96%   BMI 29.44 kg/m² "     Physical Exam  Vitals reviewed.   Constitutional:       General: He is not in acute distress.     Appearance: Normal appearance. He is well-developed. He is ill-appearing. He is not toxic-appearing or diaphoretic.   HENT:      Head: Normocephalic and atraumatic.      Nose: Nose normal.      Mouth/Throat:      Mouth: Mucous membranes are moist.   Eyes:      General: No scleral icterus.        Right eye: No discharge.         Left eye: No discharge.   Neck:      Thyroid: No thyromegaly.      Trachea: No tracheal deviation.   Pulmonary:      Effort: Pulmonary effort is normal.   Chest:      Chest wall: No tenderness.   Abdominal:      General: There is no distension.      Palpations: There is no mass.      Tenderness: There is no abdominal tenderness. There is no guarding.      Hernia: No hernia is present.      Comments: Right nephrostomy in place, clear urine   Genitourinary:     Comments: Spt clear yellow urine  Musculoskeletal:         General: No deformity or signs of injury. Normal range of motion.      Cervical back: Normal range of motion and neck supple.   Lymphadenopathy:      Cervical: No cervical adenopathy.   Skin:     General: Skin is dry.      Coloration: Skin is not jaundiced or pale.      Findings: No erythema.   Neurological:      Mental Status: He is alert and oriented to person, place, and time.      Cranial Nerves: No cranial nerve deficit.      Motor: No abnormal muscle tone.      Coordination: Coordination normal.   Psychiatric:         Mood and Affect: Mood normal.         Behavior: Behavior normal.         Thought Content: Thought content normal.         Judgment: Judgment normal.          Results    Lab Results   Component Value Date    CALCIUM 8.6 12/31/2024    K 4.6 12/31/2024    CO2 28 12/31/2024     12/31/2024    BUN 33 (H) 12/31/2024    CREATININE 1.85 (H) 12/31/2024     Lab Results   Component Value Date    WBC 7.84 12/31/2024    HGB 10.3 (L) 12/31/2024    HCT 32.9 (L)  12/31/2024    MCV 98 12/31/2024     (H) 12/31/2024     Final Diagnosis   A. Urinary Bladder, Trigone Tumor, Transurethral Resection:  - INVASIVE HIGH GRADE NEUROENDOCRINE CARCINOMA (SMALL CELL CARCINOMA).  - Tumor invades muscularis propria.      Comment: No evidence of conventional urothelial carcinoma is present.   Electronically signed by Tapan Harris DO on 12/20/2024 at 1004 EST       Office Urine Dip  No results found for this or any previous visit (from the past hour).]       Yes

## 2025-03-28 ENCOUNTER — INPATIENT (INPATIENT)
Facility: HOSPITAL | Age: 50
LOS: 1 days | Discharge: HOME | DRG: 282 | End: 2025-03-30
Attending: INTERNAL MEDICINE | Admitting: INTERNAL MEDICINE
Payer: SELF-PAY

## 2025-03-28 ENCOUNTER — TRANSCRIPTION ENCOUNTER (OUTPATIENT)
Age: 50
End: 2025-03-28

## 2025-03-28 VITALS
WEIGHT: 169.98 LBS | HEART RATE: 64 BPM | HEIGHT: 68 IN | SYSTOLIC BLOOD PRESSURE: 159 MMHG | RESPIRATION RATE: 19 BRPM | TEMPERATURE: 98 F | OXYGEN SATURATION: 99 % | DIASTOLIC BLOOD PRESSURE: 93 MMHG

## 2025-03-28 DIAGNOSIS — Z29.9 ENCOUNTER FOR PROPHYLACTIC MEASURES, UNSPECIFIED: ICD-10-CM

## 2025-03-28 DIAGNOSIS — Z90.49 ACQUIRED ABSENCE OF OTHER SPECIFIED PARTS OF DIGESTIVE TRACT: Chronic | ICD-10-CM

## 2025-03-28 DIAGNOSIS — Z98.890 OTHER SPECIFIED POSTPROCEDURAL STATES: Chronic | ICD-10-CM

## 2025-03-28 DIAGNOSIS — I21.4 NON-ST ELEVATION (NSTEMI) MYOCARDIAL INFARCTION: ICD-10-CM

## 2025-03-28 DIAGNOSIS — E78.5 HYPERLIPIDEMIA, UNSPECIFIED: ICD-10-CM

## 2025-03-28 LAB
ALBUMIN SERPL ELPH-MCNC: 4.8 G/DL — SIGNIFICANT CHANGE UP (ref 3.3–5)
ALP SERPL-CCNC: 65 U/L — SIGNIFICANT CHANGE UP (ref 40–120)
ALT FLD-CCNC: 31 U/L — SIGNIFICANT CHANGE UP (ref 12–78)
ANION GAP SERPL CALC-SCNC: 4 MMOL/L — LOW (ref 5–17)
APTT BLD: 31.9 SEC — SIGNIFICANT CHANGE UP (ref 24.5–35.6)
AST SERPL-CCNC: 21 U/L — SIGNIFICANT CHANGE UP (ref 15–37)
BASOPHILS # BLD AUTO: 0.05 K/UL — SIGNIFICANT CHANGE UP (ref 0–0.2)
BASOPHILS NFR BLD AUTO: 0.7 % — SIGNIFICANT CHANGE UP (ref 0–2)
BILIRUB SERPL-MCNC: 0.8 MG/DL — SIGNIFICANT CHANGE UP (ref 0.2–1.2)
BUN SERPL-MCNC: 10 MG/DL — SIGNIFICANT CHANGE UP (ref 7–23)
CALCIUM SERPL-MCNC: 9.6 MG/DL — SIGNIFICANT CHANGE UP (ref 8.5–10.1)
CHLORIDE SERPL-SCNC: 106 MMOL/L — SIGNIFICANT CHANGE UP (ref 96–108)
CO2 SERPL-SCNC: 28 MMOL/L — SIGNIFICANT CHANGE UP (ref 22–31)
CREAT SERPL-MCNC: 1 MG/DL — SIGNIFICANT CHANGE UP (ref 0.5–1.3)
EGFR: 92 ML/MIN/1.73M2 — SIGNIFICANT CHANGE UP
EGFR: 92 ML/MIN/1.73M2 — SIGNIFICANT CHANGE UP
EOSINOPHIL # BLD AUTO: 0.05 K/UL — SIGNIFICANT CHANGE UP (ref 0–0.5)
EOSINOPHIL NFR BLD AUTO: 0.7 % — SIGNIFICANT CHANGE UP (ref 0–6)
GLUCOSE SERPL-MCNC: 110 MG/DL — HIGH (ref 70–99)
HCT VFR BLD CALC: 50.7 % — HIGH (ref 39–50)
HGB BLD-MCNC: 17.8 G/DL — HIGH (ref 13–17)
IMM GRANULOCYTES NFR BLD AUTO: 0.3 % — SIGNIFICANT CHANGE UP (ref 0–0.9)
INR BLD: 1.04 RATIO — SIGNIFICANT CHANGE UP (ref 0.85–1.16)
LYMPHOCYTES # BLD AUTO: 2.11 K/UL — SIGNIFICANT CHANGE UP (ref 1–3.3)
LYMPHOCYTES # BLD AUTO: 31.1 % — SIGNIFICANT CHANGE UP (ref 13–44)
MCHC RBC-ENTMCNC: 31.8 PG — SIGNIFICANT CHANGE UP (ref 27–34)
MCHC RBC-ENTMCNC: 35.1 G/DL — SIGNIFICANT CHANGE UP (ref 32–36)
MCV RBC AUTO: 90.5 FL — SIGNIFICANT CHANGE UP (ref 80–100)
MONOCYTES # BLD AUTO: 0.45 K/UL — SIGNIFICANT CHANGE UP (ref 0–0.9)
MONOCYTES NFR BLD AUTO: 6.6 % — SIGNIFICANT CHANGE UP (ref 2–14)
NEUTROPHILS # BLD AUTO: 4.11 K/UL — SIGNIFICANT CHANGE UP (ref 1.8–7.4)
NEUTROPHILS NFR BLD AUTO: 60.6 % — SIGNIFICANT CHANGE UP (ref 43–77)
NRBC BLD AUTO-RTO: 0 /100 WBCS — SIGNIFICANT CHANGE UP (ref 0–0)
PLATELET # BLD AUTO: 212 K/UL — SIGNIFICANT CHANGE UP (ref 150–400)
POTASSIUM SERPL-MCNC: 4.6 MMOL/L — SIGNIFICANT CHANGE UP (ref 3.5–5.3)
POTASSIUM SERPL-SCNC: 4.6 MMOL/L — SIGNIFICANT CHANGE UP (ref 3.5–5.3)
PROT SERPL-MCNC: 8.3 G/DL — SIGNIFICANT CHANGE UP (ref 6–8.3)
PROTHROM AB SERPL-ACNC: 12.3 SEC — SIGNIFICANT CHANGE UP (ref 9.9–13.4)
RBC # BLD: 5.6 M/UL — SIGNIFICANT CHANGE UP (ref 4.2–5.8)
RBC # FLD: 12.3 % — SIGNIFICANT CHANGE UP (ref 10.3–14.5)
SODIUM SERPL-SCNC: 138 MMOL/L — SIGNIFICANT CHANGE UP (ref 135–145)
TROPONIN I, HIGH SENSITIVITY RESULT: 741.3 NG/L — HIGH
WBC # BLD: 6.79 K/UL — SIGNIFICANT CHANGE UP (ref 3.8–10.5)
WBC # FLD AUTO: 6.79 K/UL — SIGNIFICANT CHANGE UP (ref 3.8–10.5)

## 2025-03-28 PROCEDURE — 92928 PRQ TCAT PLMT NTRAC ST 1 LES: CPT | Mod: RC

## 2025-03-28 PROCEDURE — 92978 ENDOLUMINL IVUS OCT C 1ST: CPT | Mod: 26,RC

## 2025-03-28 PROCEDURE — 93458 L HRT ARTERY/VENTRICLE ANGIO: CPT | Mod: 26,59

## 2025-03-28 PROCEDURE — 99152 MOD SED SAME PHYS/QHP 5/>YRS: CPT

## 2025-03-28 PROCEDURE — 93010 ELECTROCARDIOGRAM REPORT: CPT | Mod: 76

## 2025-03-28 PROCEDURE — 99223 1ST HOSP IP/OBS HIGH 75: CPT | Mod: GC

## 2025-03-28 PROCEDURE — 99223 1ST HOSP IP/OBS HIGH 75: CPT

## 2025-03-28 PROCEDURE — 99285 EMERGENCY DEPT VISIT HI MDM: CPT

## 2025-03-28 PROCEDURE — 71046 X-RAY EXAM CHEST 2 VIEWS: CPT | Mod: 26

## 2025-03-28 RX ORDER — ATORVASTATIN CALCIUM 80 MG/1
80 TABLET, FILM COATED ORAL AT BEDTIME
Refills: 0 | Status: DISCONTINUED | OUTPATIENT
Start: 2025-03-28 | End: 2025-03-30

## 2025-03-28 RX ORDER — ASPIRIN 325 MG
81 TABLET ORAL EVERY 24 HOURS
Refills: 0 | Status: DISCONTINUED | OUTPATIENT
Start: 2025-03-29 | End: 2025-03-30

## 2025-03-28 RX ORDER — ASPIRIN 325 MG
324 TABLET ORAL ONCE
Refills: 0 | Status: COMPLETED | OUTPATIENT
Start: 2025-03-28 | End: 2025-03-28

## 2025-03-28 RX ORDER — CYCLOBENZAPRINE HYDROCHLORIDE 15 MG/1
10 CAPSULE, EXTENDED RELEASE ORAL ONCE
Refills: 0 | Status: COMPLETED | OUTPATIENT
Start: 2025-03-28 | End: 2025-03-28

## 2025-03-28 RX ORDER — METOPROLOL SUCCINATE 50 MG/1
12.5 TABLET, EXTENDED RELEASE ORAL EVERY 12 HOURS
Refills: 0 | Status: DISCONTINUED | OUTPATIENT
Start: 2025-03-28 | End: 2025-03-29

## 2025-03-28 RX ORDER — ASPIRIN 325 MG
1 TABLET ORAL
Qty: 30 | Refills: 0
Start: 2025-03-28 | End: 2025-04-26

## 2025-03-28 RX ORDER — TICAGRELOR 90 MG/1
90 TABLET ORAL EVERY 12 HOURS
Refills: 0 | Status: DISCONTINUED | OUTPATIENT
Start: 2025-03-29 | End: 2025-03-29

## 2025-03-28 RX ORDER — ATORVASTATIN CALCIUM 80 MG/1
1 TABLET, FILM COATED ORAL
Qty: 30 | Refills: 0
Start: 2025-03-28 | End: 2025-04-26

## 2025-03-28 RX ORDER — TICAGRELOR 90 MG/1
1 TABLET ORAL
Qty: 60 | Refills: 0
Start: 2025-03-28 | End: 2025-04-26

## 2025-03-28 RX ORDER — ACETAMINOPHEN 500 MG/5ML
1000 LIQUID (ML) ORAL ONCE
Refills: 0 | Status: COMPLETED | OUTPATIENT
Start: 2025-03-28 | End: 2025-03-28

## 2025-03-28 RX ADMIN — CYCLOBENZAPRINE HYDROCHLORIDE 10 MILLIGRAM(S): 15 CAPSULE, EXTENDED RELEASE ORAL at 17:00

## 2025-03-28 RX ADMIN — ATORVASTATIN CALCIUM 80 MILLIGRAM(S): 80 TABLET, FILM COATED ORAL at 21:11

## 2025-03-28 RX ADMIN — Medication 324 MILLIGRAM(S): at 17:01

## 2025-03-28 RX ADMIN — Medication 1000 MILLILITER(S): at 16:15

## 2025-03-28 RX ADMIN — Medication 400 MILLIGRAM(S): at 16:15

## 2025-03-28 RX ADMIN — Medication 75 MILLILITER(S): at 19:52

## 2025-03-28 NOTE — H&P CARDIOLOGY - NSICDXFAMILYHX_GEN_ALL_CORE_FT
FAMILY HISTORY:  Father  Still living? Unknown  FH: CAD (coronary artery disease), Age at diagnosis: 51-60

## 2025-03-28 NOTE — DISCHARGE NOTE PROVIDER - NSDCCPCAREPLAN_GEN_ALL_CORE_FT
PRINCIPAL DISCHARGE DIAGNOSIS  Diagnosis: NSTEMI (non-ST elevation myocardial infarction)  Assessment and Plan of Treatment:       SECONDARY DISCHARGE DIAGNOSES  Diagnosis: CAD (coronary artery disease)  Assessment and Plan of Treatment:      PRINCIPAL DISCHARGE DIAGNOSIS  Diagnosis: NSTEMI (non-ST elevation myocardial infarction)  Assessment and Plan of Treatment: During this hospital admission, you were admitted for a non-ST elevation myocardial infarction. This happens when the blood vessels that supply blood to your heart (coronary arteries) are blocked and thus cause an elevation in enzymes that indicate there is damage to the heart.   - Please START Toprol Xl 25mg every day  - Please START atorvastatin 80mg every day  - Please START aspirin 81mg every day  - Plese START plavix 75mg every day  Seek immediate medical attention for new or worsening chest pain or shortness of breath. Please follow up with cardiology and interventional cardiology      SECONDARY DISCHARGE DIAGNOSES  Diagnosis: CAD (coronary artery disease)  Assessment and Plan of Treatment:

## 2025-03-28 NOTE — ED PROVIDER NOTE - WR INTERPRETATION DATE TIME  1
1200: Pt arrived from cath lab in a stable condition. VSS. +2 radial pulse. Reverse elissa KNIGHT. Pt denies numbness and tingling at this time. Wrist restrictions explained and maintained.     1315: R radial TR band removed w/o complications. VSS. R radial site WDL, CDI. Wrist restrictions explained and maintained.    28-Mar-2025 18:30

## 2025-03-28 NOTE — H&P ADULT - NSHPPHYSICALEXAM_GEN_ALL_CORE
CONSTITUTIONAL: Well groomed, no apparent distress  EYES: No conjunctival or scleral injection, non-icteric  ENMT: Oral mucosa with moist membranes.   NECK: Supple, symmetric and without tracheal deviation   RESP: No respiratory distress, no use of accessory muscles; CTA b/l, no WRR  CV: RRR, +S1S2, no peripheral edema  GI: Soft, NT, ND  LYMPH: No cervical LAD or tenderness  MSK: Normal ROM without pain, no joint pain   SKIN: No rashes or ulcers noted  NEURO: Sensation intact in upper and lower extremities b/l to light touch   PSYCH: Appropriate insight/judgment; A+O x 3, mood and affect appropriate CONSTITUTIONAL: Well groomed, no apparent distress  HEENT: PERRLA. EOMI  RESP: No respiratory distress, no use of accessory muscles; CTA b/l, no WRR  CV: RRR, +S1S2, no peripheral edema  GI: Soft, NT, ND  NEURO: 5/5 muscle strength B/L  PSYCH: Appropriate insight/judgment; A+O x 3, mood and affect appropriate

## 2025-03-28 NOTE — H&P ADULT - NSHPSOCIALHISTORY_GEN_ALL_CORE
Tobacco: Never  Alcohol: Socially  Recreational Drug Use: Never  Ambulation  ADLs: Tobacco: Never  Alcohol: Socially  Recreational Drug Use: Never  Ambulation: Independent  ADLs: Independent

## 2025-03-28 NOTE — H&P ADULT - ASSESSMENT
48 y/o male w/ PMHx of family hx of CAD, HLD, RVOT VT (s/p ablation in 2018) admitted to the ED with NSTEMI.

## 2025-03-28 NOTE — H&P ADULT - ATTENDING COMMENTS
Patient is seen and examined with the Resident, Patient presented with chest pain. s/p LHC and Stent placement in RCA. Now admitted in ICU for further management. Rest of the clinical management as per ICU service. We will follow peripherally.

## 2025-03-28 NOTE — DISCHARGE NOTE PROVIDER - NSDCMRMEDTOKEN_GEN_ALL_CORE_FT
aspirin 81 mg oral tablet: 1 tab(s) orally once a day  Aspirin EC 81 mg oral delayed release tablet: 1 tab(s) orally every 24 hours  atorvastatin 80 mg oral tablet: 1 tab(s) orally once a day  simvastatin 40 mg oral tablet: 1 tab(s) orally once a day  ticagrelor 90 mg oral tablet: 1 tab(s) orally every 12 hours   aspirin 81 mg oral delayed release tablet: 1 tab(s) orally once a day  aspirin 81 mg oral delayed release tablet: 1 tab(s) orally once a day  atorvastatin 80 mg oral tablet: 1 tab(s) orally once a day (at bedtime)  atorvastatin 80 mg oral tablet: 1 tab(s) orally once a day (at bedtime)  clopidogrel 75 mg oral tablet: 1 tab(s) orally once a day  clopidogrel 75 mg oral tablet: 1 tab(s) orally once a day  losartan 25 mg oral tablet: 1 tab(s) orally once a day  losartan 25 mg oral tablet: 1 tab(s) orally once a day  metoprolol succinate 25 mg oral tablet, extended release: 1 tab(s) orally once a day  Toprol-XL 25 mg oral tablet, extended release: 1 tab(s) orally once a day   aspirin 81 mg oral delayed release tablet: 1 tab(s) orally once a day  atorvastatin 80 mg oral tablet: 1 tab(s) orally once a day (at bedtime)  clopidogrel 75 mg oral tablet: 1 tab(s) orally once a day  Toprol-XL 25 mg oral tablet, extended release: 1 tab(s) orally once a day

## 2025-03-28 NOTE — DISCHARGE NOTE PROVIDER - NSDCCPTREATMENT_GEN_ALL_CORE_FT
PRINCIPAL PROCEDURE  Procedure: Left heart catheterization with percutaneous coronary artery intervention  Findings and Treatment: PCI/KP to [facility] on 03/28/2025

## 2025-03-28 NOTE — CHART NOTE - NSCHARTNOTEFT_GEN_A_CORE
Post Diagnostic Cardiac Catheterization Chart Note      Prelim cath report:   LHC via RRA  oLCx 50%  mRCA 99% treated with successful PCI/KP  LVEDP 20 mmHg.   full/official report to follow      Patient without complaints. Denies CP, SOB, palpitations, N/V, fever/chills, abd pain, numbness/tingling/weakness, other c/o at this time.    A+O x 3, neurologically intact  RRA access site stable (clean, dry, intact, without bleeding, heat, erythema, or hematoma). Radial band in place.  RUE motor, neuro, circ intact.  Hemodynamically stable, neurologically intact, VS stable, afebrile    A/P: s/p urgent LHC and PCI  - Pt is already in-patient, observe overnight in ICU.  Transfer care of patient to hospitalist service.  Signed out to Dr. Cruz.   - Post cath/PCI routine VS, access site, neuro-vascular monitoring and RUE post access precautions ordered  - Post cath access site precautions reviewed with pt who verbalized good understanding  - Post cath hydration as ordered  - Remove radial band ~ 22:00  - Bedrest. May get OOB 30 minutes after radial band removed if wrist and hemodynamics remain stable   - EKG post cath done  - f/u labs and EKG in am  - Trend troponin to peak.    - TTE ordered, please f/u results.   - Continue uninterrupted dual anti platelet therapy with aspirin AND ticagrelor.  First month e-prescribed to [facility].  Will need to follow up with pharmacy to determined ticagrelor co-pay; if cost prohibitive, will need to switch to clopidogrel.    - Pt education provided/reinforced re: importance of strict adherence to uninterrupted DAPT for minimum of 9-12 months (cardiologist will determine duration)  - Patient educated on benefits of Cardiac Rehab Program; referral provided to patient. Referral faxed and copy placed in medical record. Patient given list of locations with phone numbers of local rehab facilities and advised to contact their insurance company for participating providers. Patient educated on need to bring discharge documents including cardiovascular history, medications, and testing/treatments to first appointment.  -  Sart high intensity statin; switched simvastatin to atorvastatin 80 mg QD  - Start low dose BB with metoprolol tartate 12.5 mg BID.  Titrate medications as BP and HR tolerates.  If tolerating, recommend discharging home on equivalent Toprol XL dose.    - Cardiac diet  - Lifestyle modifications discussed to reduce cardiovascular risk factors including weight reduction, smoking cessation (referral provided if applicable), medication compliance, and routine follow up with Cardiologist to track your BMI, cholesterol, and glucose levels.   Discharge in am if stable  - Follow-up next week with Dr Garcia for post PCI check  - Follow-up in 2 weeks with outpatient/referring cardiologist  - Remainder of plan per primary team/non-interventional cardiology

## 2025-03-28 NOTE — ED PROVIDER NOTE - PROGRESS NOTE DETAILS
case discussed with Dr Lundy- recommending admission to interventional cardiology for urgent cardiac cath.

## 2025-03-28 NOTE — DISCHARGE NOTE PROVIDER - HOSPITAL COURSE
HPI:  48 y/o male w/ PMHx of family hx of CAD, HLD, RVOT VT (s/p ablation in 2018) presents to the ED with NSTEMI. Pt endorses having sharp, diffuse chest pain that radiated to his shoulder and jaws at 10 pm last night while eating ice cream. Pt was sitting in a chair when the most recent episode occurred. Pt endorses pain lasted thirty minutes in that he walked it off. Pt endorses episode of watery diarrhea yesterday with the episode but denied that today. Today, pt reported some "chest soreness" this morning that prompted him to come to the ED to get it evaluated. A similar episode of chest pain on Tuesday that lasted ten minutes before self-resolving.    IN THE ED:  Temp 97.8F, HR 64, /93, RR 19, SpO2 99%  S/P 1g ofirmev, 1L NS, 10 mg cyclobenzaprine, 325 mg aspirin,   EKG: Vent Rate: 63 BPM, WA interval: 162 ms, QRS: 78 ms, QT/QTc: 398/407. Impression: Normal Sinus Rhythm  Labs significant for Glucose: 110 Trops: 741.3   (28 Mar 2025 19:08)      ---  HOSPITAL COURSE: Patient admitted to medicine floor for management of     Pt seen and examined on day of discharge. Patient is medically optimized for discharge to home with close outpatient followup.    PHYSICAL EXAM ON DAY OF DISCHARGE:  The patient was seen and examined on the day of discharge. Please see progress note from day of discharge for further information.     ---  CONSULTANTS:     ---  TIME SPENT:  I, the attending physician, was physically present for the key portions of the evaluation and management (E/M) service provided. The total amount of time spent reviewing the hospital notes, laboratory values, imaging findings, assessing/counseling the patient, discussing with consultant physicians, social work, nursing staff was -- minutes    ---  Primary care provider was made aware of plan for discharge:      [  ] NO     [  ] YES   HPI:  50 y/o male w/ PMHx of family hx of CAD, HLD, RVOT VT (s/p ablation in 2018) presents to the ED with NSTEMI. Pt endorses having sharp, diffuse chest pain that radiated to his shoulder and jaws at 10 pm last night while eating ice cream. Pt was sitting in a chair when the most recent episode occurred. Pt endorses pain lasted thirty minutes in that he walked it off. Pt endorses episode of watery diarrhea yesterday with the episode but denied that today. Today, pt reported some "chest soreness" this morning that prompted him to come to the ED to get it evaluated. A similar episode of chest pain on Tuesday that lasted ten minutes before self-resolving.    IN THE ED:  Temp 97.8F, HR 64, /93, RR 19, SpO2 99%  S/P 1g ofirmev, 1L NS, 10 mg cyclobenzaprine, 325 mg aspirin,   EKG: Vent Rate: 63 BPM, NE interval: 162 ms, QRS: 78 ms, QT/QTc: 398/407. Impression: Normal Sinus Rhythm  Labs significant for Glucose: 110 Trops: 741.3   (28 Mar 2025 19:08)      ---  HOSPITAL COURSE: Patient admitted to medicine floor for management of NSTEMI. Pt underwent LHC on 3/28, found do have 99% stenosis in mRCA, KP x1 placed.   Pt started on heparin drip and DAPT with ASA and plavix. Pt also started on high intensity statin and toprol XL. TTE revealed normal LVEF at 55 to 60 %. Basal and mid inferior wall and basal inferoseptal segment are abnormal. Normal right ventricular cavity size, with normal wall thickness, and normal right ventricular systolic function. Tricuspid aortic valve with normal leaflet excursion. Trace mitral regurgitation. Pt continuously monitored on telemetry without events. Remainder of hospital course uneventful.      Pt seen and examined on day of discharge. Patient is medically optimized for discharge to home with close outpatient followup.    PHYSICAL EXAM ON DAY OF DISCHARGE:  The patient was seen and examined on the day of discharge. Please see progress note from day of discharge for further information.     ---  CONSULTANTS:   Cardiology (Dr. Lundy)  Interventional Cardiology (Dr. Garcia)     ---  TIME SPENT:  I, the attending physician, was physically present for the key portions of the evaluation and management (E/M) service provided. The total amount of time spent reviewing the hospital notes, laboratory values, imaging findings, assessing/counseling the patient, discussing with consultant physicians, social work, nursing staff was -- minutes    ---  Primary care provider was made aware of plan for discharge:      [  ] NO     [  ] YES

## 2025-03-28 NOTE — PATIENT PROFILE ADULT - FALL HARM RISK - HARM RISK INTERVENTIONS

## 2025-03-28 NOTE — CONSULT NOTE ADULT - SUBJECTIVE AND OBJECTIVE BOX
Patient is a 49y old  Male who presents with a chief complaint of NSTEMI (28 Mar 2025 19:08)    BRIEF HOSPITAL COURSE: 49 year old male with a PMH of HLD, RVOT VT treated with ablation who presented to the ED with chest pain.  Initial troponin in the ED resulted at 741.  Underwent LHC which revealed mRCA 99% treated with KP x1.  Admitted to ICU following this.      ROS: 10 point ROS reviewed and negative.     PAST MEDICAL & SURGICAL HISTORY:  Hyperlipidemia, unspecified hyperlipidemia type  H/O cardiac radiofrequency ablation  History of appendectomy    Medications:  metoprolol tartrate 12.5 milliGRAM(s) Oral every 12 hours  atorvastatin 80 milliGRAM(s) Oral at bedtime  sodium chloride 0.9% Bolus 250 milliLiter(s) IV Bolus once  sodium chloride 0.9%. 1000 milliLiter(s) IV Continuous <Continuous>  sodium chloride 0.9%. 1000 milliLiter(s) IV Continuous <Continuous>      ICU Vital Signs Last 24 Hrs  T(C): 36.7 (28 Mar 2025 17:13), Max: 36.7 (28 Mar 2025 17:13)  T(F): 98.1 (28 Mar 2025 17:13), Max: 98.1 (28 Mar 2025 17:13)  HR: 80 (28 Mar 2025 20:15) (64 - 89)  BP: 125/97 (28 Mar 2025 20:15) (125/93 - 159/93)  BP(mean): 106 (28 Mar 2025 20:15) (102 - 109)  ABP: --  ABP(mean): --  RR: 14 (28 Mar 2025 20:15) (11 - 19)  SpO2: 99% (28 Mar 2025 20:15) (95% - 99%)    O2 Parameters below as of 28 Mar 2025 17:13  Patient On (Oxygen Delivery Method): room air                I&O's Detail    28 Mar 2025 07:01  -  28 Mar 2025 20:25  --------------------------------------------------------  IN:    sodium chloride 0.9%: 150 mL  Total IN: 150 mL    OUT:    Voided (mL): 500 mL  Total OUT: 500 mL    Total NET: -350 mL            LABS:                        17.8   6.79  )-----------( 212      ( 28 Mar 2025 16:11 )             50.7     03-28    138  |  106  |  10  ----------------------------<  110[H]  4.6   |  28  |  1.00    Ca    9.6      28 Mar 2025 16:11    TPro  8.3  /  Alb  4.8  /  TBili  0.8  /  DBili  x   /  AST  21  /  ALT  31  /  AlkPhos  65  03-28          CAPILLARY BLOOD GLUCOSE        PT/INR - ( 28 Mar 2025 17:18 )   PT: 12.3 sec;   INR: 1.04 ratio         PTT - ( 28 Mar 2025 17:18 )  PTT:31.9 sec  Urinalysis Basic - ( 28 Mar 2025 16:11 )    Color: x / Appearance: x / SG: x / pH: x  Gluc: 110 mg/dL / Ketone: x  / Bili: x / Urobili: x   Blood: x / Protein: x / Nitrite: x   Leuk Esterase: x / RBC: x / WBC x   Sq Epi: x / Non Sq Epi: x / Bacteria: x      CULTURES:      Physical Examination:    General: No acute distress.      PULM: Clear to auscultation bilaterally    CVS: Regular rate and rhythm    ABD: Soft, nondistended, nontender    EXT: No edema, nontender    SKIN: Warm and well perfused, no rashes noted.    NEURO: Alert, oriented, interactive, nonfocal    
[facility] - Cyrus Santos Pannella, Patel, Savella, Cohen Woody  Office Number: [phone]    Initial Consult Note: This is a 50 y/o M with Hx of idiopathic VT s/p ablation (Dr. Fuentes in 2018) and HLD presented to the ED c/o intermittent CP, shoulder, and back pain that started 3 days ago.  Felt he was going to die.  Denies N/V/diaphoresis.  Denies SOB, SOARES or orthopnea.  Denies palpitation, fever, chills.  Denies taking any medication for pain relief.    In the ED, troponin showed elevated at 741.  EKG showd NSR with no ischemic changes.    CHIEF COMPLAINT: Patient is a 49y old  Male who presents with a chief complaint of     HPI:    PAST MEDICAL & SURGICAL HISTORY:  Hyperlipidemia, unspecified hyperlipidemia type      No pertinent past medical history      H/O cardiac radiofrequency ablation        SOCIAL HISTORY:  No tobacco, ethanol, or drug abuse.  FAMILY HISTORY:  No pertinent family history in first degree relatives      No family history of acute MI or sudden cardiac death.  MEDICATIONS  (STANDING):    MEDICATIONS  (PRN):    Allergies    No Known Allergies    Intolerances      REVIEW OF SYSTEMS:  CONSTITUTIONAL: No weakness, fevers or chills  EYES/ENT: No visual changes;  No vertigo or throat pain   NECK: No pain or stiffness  RESPIRATORY: No cough, wheezing, hemoptysis; No shortness of breath  CARDIOVASCULAR: + chest pain or palpitations  GASTROINTESTINAL: No abdominal pain. No nausea, vomiting, or hematemesis; No diarrhea or constipation. No melena or hematochezia.  GENITOURINARY: No dysuria, frequency or hematuria  NEUROLOGICAL: No numbness or weakness  SKIN: No itching or rash  All other review of systems is negative unless indicated above  VITAL SIGNS:   Vital Signs Last 24 Hrs  T(C): 36.6 (28 Mar 2025 14:41), Max: 36.6 (28 Mar 2025 14:41)  T(F): 97.8 (28 Mar 2025 14:41), Max: 97.8 (28 Mar 2025 14:41)  HR: 64 (28 Mar 2025 14:41) (64 - 64)  BP: 159/93 (28 Mar 2025 14:41) (159/93 - 159/93)  BP(mean): --  RR: 19 (28 Mar 2025 14:41) (19 - 19)  SpO2: 99% (28 Mar 2025 14:41) (99% - 99%)    Parameters below as of 28 Mar 2025 14:41  Patient On (Oxygen Delivery Method): room air      I&O's Summary    On Exam:  Constitutional: NAD, alert and oriented x 3  Lungs:  Non-labored, breath sounds are clear bilaterally, No wheezing, rales or rhonchi  Cardiovascular: RRR.  S1 and S2 positive.  No murmurs, rubs, gallops or clicks  Gastrointestinal: Bowel Sounds present, soft, nontender.   Lymph: No peripheral edema. No cervical lymphadenopathy.  Neurological: Alert, no focal deficits  Skin: No rashes or ulcers   Psych:  Mood & affect appropriate.    LABS: All Labs Reviewed:                        17.8   6.79  )-----------( 212      ( 28 Mar 2025 16:11 )             50.7     28 Mar 2025 16:11    138    |  106    |  10     ----------------------------<  110    4.6     |  28     |  1.00     Ca    9.6        28 Mar 2025 16:11    TPro  8.3    /  Alb  4.8    /  TBili  0.8    /  DBili  x      /  AST  21     /  ALT  31     /  AlkPhos  65     28 Mar 2025 16:11    RADIOLOGY:    Patient name: RUBY JOHNSON  YOB: 1975   Age: 43 (M)   MR#: [id number]  Study Date: 8/28/2018  [facility]  Study quality: Technically good  Referring Physician: Daniel Allen MD  Blood Pressure: 122/76 mmHg  Height: 173 cm  Weight: 82 kg  BSA: 2 m2  ------------------------------------------------------------------------  PROCEDURE: Transthoracic echocardiogram with 2-D, M-Mode  and complete spectral and color flow Doppler.  INDICATION: Abnormal electrocardiogram (ECG) (EKG) (R94.31)  ------------------------------------------------------------------------  Dimensions:    Normal Values:  LA:     3.5    2.0 - 4.0 cm  Ao:     3.0    2.0 - 3.8 cm  SEPTUM: 0.9    0.6 - 1.2 cm  PWT:    0.8    0.6 - 1.1 cm  LVIDd:  4.3    3.0 - 5.6 cm  LVIDs:  3.2    1.8 - 4.0 cm  Derived variables:  LVMI: 58 g/m2  RWT: 0.37  Fractional short: 26 %  EF (Teicholtz): 51 %  Doppler Peak Velocity (m/sec): AoV=1.1  ------------------------------------------------------------------------  Observations:  Mitral Valve: Normal mitral valve. Mild mitral  regurgitation.  Aortic Valve/Aorta: Normal trileaflet aortic valve. Peak  transaortic valve gradient equals 5 mm Hg. Minimal aortic  regurgitation.  Peak left ventricular outflow tract  gradient equals 3 mm Hg.  Aortic Root: 3 cm.  Left Atrium: LA volume index = 15 cc/m2.  Left Ventricle: Normal left ventricular systolic function.  No segmental wall motion abnormalities. Normal left  ventricular internal dimensions and wall thicknesses.  Right Heart: Normal right atrium. Normal right ventricular  size and function. Normal tricuspid valve. Mild tricuspid  regurgitation. Normal pulmonic valve.  Pericardium/Pleura: Normal pericardium with no pericardial  effusion.  Hemodynamic: Estimated right atrial pressure is 8 mm Hg.  Estimated right ventricular systolic pressure equals 26 mm  Hg, assuming right atrial pressure equals 8 mm Hg,  consistent with normal pulmonary pressures.  ------------------------------------------------------------------------  Conclusions:  1. Normal left ventricular internal dimensions and wall  thicknesses.  2. Normal left ventricular systolic function. No segmental  wall motion abnormalities.  3. Normal right ventricular size and function.  4. Normal tricuspid valve. Mild tricuspid regurgitation.  5. Estimated pulmonary artery systolic pressure equals 26  mm Hg, assuming right atrial pressure equals 8  mm Hg,  consistent with normal pulmonary pressures.  ------------------------------------------------------------------------  Confirmed on  8/28/2018 - 14:49:37 by Holly Pacheco M.D.  ------------------------------------------------------------------------  EKG: NSR with no ischemic changes

## 2025-03-28 NOTE — ED ADULT NURSE NOTE - OBJECTIVE STATEMENT
Patient received complaining of chest pain intermittently for the past few days with radiating to back and head, states h/x of afib with ablation. Patient is AOx4, EKG done, safety precautions in place, awaiting evaluation

## 2025-03-28 NOTE — ED PROVIDER NOTE - CLINICAL SUMMARY MEDICAL DECISION MAKING FREE TEXT BOX
Sent in by cardiology due to intermittent chest pain. Found to have elevated troponin and admitted for urgent catheterization. patient chest pain free while in ED.

## 2025-03-28 NOTE — CONSULT NOTE ADULT - NS ATTEND AMEND GEN_ALL_CORE FT
50 y/o M with Hx of idiopathic VT s/p ablation (Dr. Fuentes in 2018) and HLD presented to the ED c/o intermittent CP that started 3 days ago.  No associated symptoms.    Chest Pain, Elevated Troponins  - No known Hx of CAD  - Has been having intermittent CP x 1 week  - EKG showed NSR, no ischemic changes   - Troponin elevated at 741.  Would obtain CPK's and continue to trend till peaks  - s/p  mg PO x 1 in ED.   - to get Brilinta and heparin in cath lab  - IC called to evaluate patient for possible LHC tonight  - Start high intensity statin.  On home Lipitor  - Obtain TTE  - Monitor on tele    - Had a normal TTE in 2018  - Euvolemic on exam    - BP elevated at 150's, no known Hx of HTN.   - Can start Lopressor 25 mg q12H    - Monitor electrolytes, replete to keep K>4 and Mag>2  - Will continue to follow

## 2025-03-28 NOTE — CONSULT NOTE ADULT - ASSESSMENT
49 year old male with a PMH of HLD, RVOT VT treated with ablation who presented to the ED with chest pain.    Problem list:  NSTEMI    -DAPT w/ ASA and Brilinta  -Statin and BB  -F/u AM A1C and lipid panel  -TTE ordered   -DASH diet  -Post cath IVF hydration  -SCDs DVT ppx     Discussed w/ interventional cardiology and ICU attending.  49 year old male with a PMH of HLD, RVOT VT treated with ablation who presented to the ED with chest pain.    Problem list:  NSTEMI    -DAPT w/ ASA and Brilinta  -Statin and BB  -F/u AM A1C and lipid panel  -TTE ordered   -DASH diet  -Post cath IVF hydration  -SCDs DVT ppx     Discussed w/ interventional cardiology and ICU attending.     Time spent on this patient encounter, which includes documenting this note in the electronic medical record, was 57 minutes including assessing the presenting problems with associated risks, reviewing the medical record to prepare for the encounter, and meeting face to face with patient to obtain additional history. I have also performed an appropriate physical exam, made interventions listed and ordered and interpreted appropriate diagnostic studies as documented. To improve communication and patient safety, I have coordinated care with the multidisciplinary team including the bedside nurse, appropriate attending of record and consultants as needed.  Date of entry of this note is equal to the date of services rendered.

## 2025-03-28 NOTE — DISCHARGE NOTE PROVIDER - NSDCFUADDINST_GEN_ALL_CORE_FT
Wound Care:   the day AFTER your procedure...     Remove the bandage from the site and gently clean with soap and water then pat dry; leave open to air.     You may take a brief shower     Do NOT apply lotions, creams, powders, ointments, or perfumes to your incision site unless prescribed by your physician     Do NOT soak your procedure site for 1 week (no baths, no pools, no tubs, etc...)     Check  your groin and /or wrist daily. A small amount of bruising, and soreness are normal    ACTIVITY: for 24 hours      - DO NOT DRIVE     - DO NOT make any important decisions or sign legal documents      - DO NOT operate heavy machinery      - you may resume sexual activity in 48 hours, unless otherwise instructed by your cardiologist          If your procedure was done through the WRIST: for the NEXT 3 DAYS:          - avoid pushing, pulling, with that affected wrist (such as pushing up from a seated position)          - avoid repeated movement of that hand and wrist (such as typing or hammering)          - DO NOT LIFT anything more than 5 pounds         If your procedure was done through the GROIN: for the NEXT 5 DAYS          - Limit climbing stairs, DO NOT soak in bathtub or pool          - no strenous activities, pushing, pulling, straining          - Do not lift anything more than 10 pounds     MEDICATION:      Please take your medications as explained to you (found on your discharge paperwork)      If you received a stent, you will be taking medication to KEEP YOUR STENT OPEN.            You MUST start taking this medication immediately.           Take this medication as prescribed and uninterrupted.            DO NOT STOP taking them for any reason without consulting with your cardiologist first.      **if you have diabetes and take metformin please do not take this medication for 2 days after the procedure. Restart and take as usual starting day 3.    Follow the heart healthy diet recommended by your doctor.   Drink plenty of water for the next 24 hours unless otherwise instructed.  Do not drink any alcoholic beverages for 24 hours (beer, wine, liquor, etc).    If you smoke: STOP SMOKING. Call the Center for Tobacco Control at [phone] for assistance.    CALL your cardiologist/primary care doctor to make a follow-up appointment in 2 WEEKS     **CALL YOUR DOCTOR if you experience     fever, chills, body aches, or severe pain, swelling, redness, heat or yellow discharge at incision site     bleeding or excruciating pain at the procedural site, swelling (golf ball size) at your procedural site     CHEST PAIN     numbness, tingling, temperature change (of your procedural site)     Pain  -you may have pain after your surgery or procedure at the puncture site or in the artery/vein that has been treated.  -take pain medication as directed by your doctor.  call your doctor if your pain is not getting better within 5 days or if it gets worse  -prescription pain medication should be taken with food, and can cause constipation, an over-the-counter softener may be helpful    Nausea  -anesthesia/sedation can upset your stomach  -eat bland foods (Jell-o, crackers, toast) and drink ginger ale if you are nauseated  -drink plenty of fluids such as water or ginger ale (unless instructed otherwise by your doctor)  -if you have nausea or vomiting the day after your procedure, call your doctor    Bleeding  -you may have a small amount of oozing from your surgical or procedural site  -bleeding as the site can be dangerous and should prompt immediate medical attention    Infection  -if you have any of the following signs of infection, call your doctor:       redness, swelling, fever over 101 degrees, thick yellow/white drainage    If you are unable to reach your doctor, you may contact:   Dr Chandler Garcia @ [phone]    **Call 911 immediately if:     - your hand or leg becomes blue, feels cold to touch, or if you have numbness or tingling     - bleeding or swelling from your wrist or groin site cannot be controlled or if area becomes very red or hot to touch     - you have pain, pressure, tightness or burning in your chest, arms, jaw or stomach; shortness of breath; nausea or excessive sweating; lightheadedness; dizziness or a fainting spell; or if you have sudden back or stomach pain     -you have rapid heartbeat or palpitations     - you have bright red blood in large amounts, severe pain at access site (wrist or groin) or significant new swelling at the puncture site    If/because you had anesthesia, for the next 24 hours you should NOT:  -drive a car, operate power tool or machinery  -drink alcohol, beer, or wine  -make important personal or business decisions  If you had any type of sedation, you may experience lightheadedness, dizziness, or sleepiness following your procedure. A responsible adult should stay with you for at least 24 hours following your procedure.  Wound Care:   the day AFTER your procedure...     Remove the bandage from the site and gently clean with soap and water then pat dry; leave open to air.     You may take a brief shower     Do NOT apply lotions, creams, powders, ointments, or perfumes to your incision site unless prescribed by your physician     Do NOT soak your procedure site for 1 week (no baths, no pools, no tubs, etc...)     Check  your groin and /or wrist daily. A small amount of bruising, and soreness are normal    ACTIVITY: for 24 hours      - DO NOT DRIVE     - DO NOT make any important decisions or sign legal documents      - DO NOT operate heavy machinery      - you may resume sexual activity in 48 hours, unless otherwise instructed by your cardiologist          If your procedure was done through the WRIST: for the NEXT 3 DAYS:          - avoid pushing, pulling, with that affected wrist (such as pushing up from a seated position)          - avoid repeated movement of that hand and wrist (such as typing or hammering)          - DO NOT LIFT anything more than 5 pounds    MEDICATION:      Please take your medications as explained to you (found on your discharge paperwork)      If you received a stent, you will be taking medication to KEEP YOUR STENT OPEN.            You MUST start taking this medication immediately.           Take this medication as prescribed and uninterrupted.            DO NOT STOP taking them for any reason without consulting with your cardiologist first.      **if you have diabetes and take metformin please do not take this medication for 2 days after the procedure. Restart and take as usual starting day 3.    Follow the heart healthy diet recommended by your doctor.   Drink plenty of water for the next 24 hours unless otherwise instructed.  Do not drink any alcoholic beverages for 24 hours (beer, wine, liquor, etc).    If you smoke: STOP SMOKING. Call the Center for Tobacco Control at [phone] for assistance.    CALL your cardiologist/primary care doctor to make a follow-up appointment in 2 WEEKS     **CALL YOUR DOCTOR if you experience     fever, chills, body aches, or severe pain, swelling, redness, heat or yellow discharge at incision site     bleeding or excruciating pain at the procedural site, swelling (golf ball size) at your procedural site     CHEST PAIN     numbness, tingling, temperature change (of your procedural site)     Pain  -you may have pain after your surgery or procedure at the puncture site or in the artery/vein that has been treated.  -take pain medication as directed by your doctor.  call your doctor if your pain is not getting better within 5 days or if it gets worse  -prescription pain medication should be taken with food, and can cause constipation, an over-the-counter softener may be helpful    Nausea  -anesthesia/sedation can upset your stomach  -eat bland foods (Jell-o, crackers, toast) and drink ginger ale if you are nauseated  -drink plenty of fluids such as water or ginger ale (unless instructed otherwise by your doctor)  -if you have nausea or vomiting the day after your procedure, call your doctor    Bleeding  -you may have a small amount of oozing from your surgical or procedural site  -bleeding as the site can be dangerous and should prompt immediate medical attention    Infection  -if you have any of the following signs of infection, call your doctor:       redness, swelling, fever over 101 degrees, thick yellow/white drainage    If you are unable to reach your doctor, you may contact:   Dr Chandler Garcia @ [phone]    **Call 911 immediately if:     - your hand or leg becomes blue, feels cold to touch, or if you have numbness or tingling     - bleeding or swelling from your wrist or groin site cannot be controlled or if area becomes very red or hot to touch     - you have pain, pressure, tightness or burning in your chest, arms, jaw or stomach; shortness of breath; nausea or excessive sweating; lightheadedness; dizziness or a fainting spell; or if you have sudden back or stomach pain     -you have rapid heartbeat or palpitations     - you have bright red blood in large amounts, severe pain at access site (wrist or groin) or significant new swelling at the puncture site    If/because you had anesthesia, for the next 24 hours you should NOT:  -drive a car, operate power tool or machinery  -drink alcohol, beer, or wine  -make important personal or business decisions  If you had any type of sedation, you may experience lightheadedness, dizziness, or sleepiness following your procedure. A responsible adult should stay with you for at least 24 hours following your procedure.

## 2025-03-28 NOTE — ED PROVIDER NOTE - ATTENDING APP SHARED VISIT CONTRIBUTION OF CARE
This was a shared visit with MAY. I reviewed and verified the documentation and independently performed the documented MDM. Patient seen and examined by myself.

## 2025-03-28 NOTE — H&P CARDIOLOGY - HISTORY OF PRESENT ILLNESS
50 y/o male, + family hx for CAD (Father with PCI/stent at age 58), with dyslipidemia and h/o RVOT VT treated with ablation in 10/2018 at [facility], who presented to ED with chest pain.      Patient reports and episode of "sharp" diffuse chest pain that radiated to his shoulder and jaws around 10 pm last night.  He states he was eating ice cream at the onset of his pain.  His pain lasted ~ 1 hour before resolving spontaneously.  He denies and associated SOB, diaphoresis, palpitations, syncope/near syncope.  He reports a similar episode of chest pain on Tuesday which occurred at work  lasted ~ 30 minuted before spontaneously resolving.      His initial ECG revealed NSR with no acute ST segment deviation however he R/I for NSTEMI with initial troponin of 700.  He is currently chest pain free and HD stable.  Interventional Cardiology consulted to evaluate for left heart catheterization

## 2025-03-28 NOTE — DISCHARGE NOTE PROVIDER - NSDCFUADDAPPT_GEN_ALL_CORE_FT
Patient educated on benefits of Cardiac Rehab Program; referral provided to patient. Referral faxed and copy placed in medical record. Patient given list of locations with phone numbers of local rehab facilities and advised to contact their insurance company for participating providers. Patient educated on need to bring discharge documents including cardiovascular history, medications, and testing/treatments to first appointment.

## 2025-03-28 NOTE — ED ADULT NURSE NOTE - NSFALLLASTSIX_ED_ALL_ED
Patient would like to have is yearly follow up schedule sooner. He states his blood pressure is high, fatigue, and chest pressure. He states the pressure goes away sometimes after burping.      Sending as FREDDY  
Phone call to patient. He checks his BP sporadically. Not on any schedule.     He reports his BP readings mostly 170+/ 70-80's .    He states he had a bad cold about a month ago and still feels fatigued, No ambition to do anything.   He has some intermittent lightheadedness.  He was treated for Vertigo but told he stall has some vertigo from  his neck.      He gets episodes of chest pressure he states feels more like a \" fullness\" not a pain and it resolves with belching. Worse when bending forward.  No radiating pain.    No SOB , MURRIETA , \" a tad, at times\". Not often.     Several intolerances to antihypertensives noted on previous notes.    He does report \" cramps\" in his neck. Legs ad feet he feels is from the spironolactone.     Advised writer will review with provider to se if nay testing is warranted or if we can offer an office visit, next available.   
No.

## 2025-03-28 NOTE — DISCHARGE NOTE PROVIDER - CARE PROVIDER_API CALL
Ed Riley  Cardiovascular Disease  [address]  [place] [address]  Phone: [phone]  Fax: [phone]  Established Patient  Follow Up Time: 2 weeks    Chandler Garcia  [address] [facility] [place] [address]  Phone: [phone]  Fax: (   )    -  Follow Up Time: 1 week

## 2025-03-28 NOTE — H&P ADULT - HISTORY OF PRESENT ILLNESS
Pt's appts have been relinked, D8C3 is scheduled for 4/2.    Thanks, Rocio   48 y/o male w/ PMHx of family hx of CAD, HLD, RVOT VT (s/p ablation in 2018) presents to the ED with NSTEMI. Pt endorses having sharp, diffuse chest pain that radiated to his shoulder and jaws at 10 pm last night while eating ice cream. Pt endorses pain last an hour. A similar episode of chest pain on Tuesday that lasted 30 minutes before resolving.    IN THE ED:  Temp 97.8F, HR 64, /93, RR 19, SpO2 99%  S/P 1g ofirmev, 1L NS, 10 mg cyclobenzaprine, 325 mg aspirin,   EKG: Vent Rate: 63 BPM, VT interval: 162 ms, QRS: 78 ms, QT/QTc: 398/407. Impression: Normal Sinus Rhythm  Labs significant for Glucose: 110 Trops: 741.3  Imaging:   50 y/o male w/ PMHx of family hx of CAD, HLD, RVOT VT (s/p ablation in 2018) presents to the ED with NSTEMI. Pt endorses having sharp, diffuse chest pain that radiated to his shoulder and jaws at 10 pm last night while eating ice cream. Pt was sitting in a chair when the most recent episode occurred. Pt endorses pain lasted thirty minutes in that he walked it off. Pt endorses episode of watery diarrhea yesterday with the episode but denied that today. Today, pt reported some "chest soreness" this morning that prompted him to come to the ED to get it evaluated. A similar episode of chest pain on Tuesday that lasted ten minutes before self-resolving.    IN THE ED:  Temp 97.8F, HR 64, /93, RR 19, SpO2 99%  S/P 1g ofirmev, 1L NS, 10 mg cyclobenzaprine, 325 mg aspirin,   EKG: Vent Rate: 63 BPM, DE interval: 162 ms, QRS: 78 ms, QT/QTc: 398/407. Impression: Normal Sinus Rhythm  Labs significant for Glucose: 110 Trops: 741.3

## 2025-03-28 NOTE — H&P ADULT - NSHPREVIEWOFSYSTEMS_GEN_ALL_CORE
REVIEW OF SYSTEMS:  CONSTITUTIONAL: No fever/chills or appetite changes.  HENMT: No HA, lightheadedness/dizziness  RESPIRATORY: No cough, wheezing, hemoptysis; No shortness of breath.  CARDIOVASCULAR: No chest pain, palpitations.  GASTROINTESTINAL: No abdominal or epigastric pain. No nausea or vomiting; No diarrhea or constipation.  GENITOURINARY: No dysuria, changes in frequency, hematuria, or incontinence  NEUROLOGICAL: Baseline strength. Sensation intact bilaterally.  SKIN: No itching, rashes  MUSCULOSKELETAL: No joint pain or swelling; No muscle, back, or extremity pain REVIEW OF SYSTEMS:  CONSTITUTIONAL: No fever/chills   HENMT: No lightheadedness/dizziness  RESPIRATORY:  No shortness of breath.  CARDIOVASCULAR: (+) chest pain, no palpitations.  GASTROINTESTINAL: No abdominal or epigastric pain. No nausea or vomiting; No diarrhea or constipation.  GENITOURINARY: No dysuria, changes in frequency

## 2025-03-28 NOTE — ED ADULT NURSE NOTE - RESPIRATORY ASSESSMENT
Large Joint Aspiration/Injection: bilateral knee    Date/Time: 4/6/2023 10:00 AM  Performed by: Celso Cortes MD  Authorized by: Celso Cortes MD     Consent Done?:  Yes (Verbal)  Indications:  Pain  Site marked: the procedure site was marked    Timeout: prior to procedure the correct patient, procedure, and site was verified      Details:  Needle Size:  20 G  Approach:  Anterolateral  Location:  Knee  Laterality:  Bilateral  Site:  Bilateral knee  Medications (Right):  16 mg hyaluronate 16 mg/2 mL  Medications (Left):  16 mg hyaluronate 16 mg/2 mL  Patient tolerance:  Patient tolerated the procedure well with no immediate complications  
no...
- - -

## 2025-03-28 NOTE — ED ADULT TRIAGE NOTE - CHIEF COMPLAINT QUOTE
pt ambulated into the ED without difficulty C/O intermittent episodes of chest pain x1 week. was sent to ED by urgent care. hx of afib with ablation

## 2025-03-28 NOTE — ED PROVIDER NOTE - OBJECTIVE STATEMENT
49-year-old male with history of A-fib s/p ablation 2019, HLD, on ASA 81 Mg complaining of back pain x 1 week as well as chest pain for the past few days.  States he has been having pain to his posterior neck and towards upper back for the past week denies any heavy lifting or falls or trauma.  Earlier this week he had an episode of intense chest pain across his upper chest.  This happened again yesterday.  States it also radiated up into his jaw and teeth.  Each time he ignored the symptoms.  Today feeling mild discomfort to his chest.  Neck pain is somewhat reproducible with movement but chest pain is not.  Denies fever, chills, trouble breathing, SOARES, cough, leg pain or swelling, nausea, vomiting.

## 2025-03-28 NOTE — CONSULT NOTE ADULT - ASSESSMENT
Assessment/Plan: 50 y/o M with Hx of idiopathic VT s/p ablation (Dr. Fuentes in 2018) and HLD presented to the ED c/o intermittent CP that started 3 days ago.  No associated symptoms.    Chest Pain, Elevated Troponins  - No known Hx of CAD  - Had negative Nuclear Stress Test in 2018  - Has been having intermittent CP x 1 week  - EKG showed NSR, no ischemic changes   - Troponin elevated at 741.  Would obtain CPK's and continue to trend till peaks  - s/p  mg PO x 1 in ED.  Will give Plavix 600 mg x 1 now  - IC called to evaluate patient for possible LHC  - Give Heparin 5000 units IV bolus x 1  - Start high intensity statin.  On home Lipitor  - Obtain TTE  - Monitor on tele    - Had a normal TTE in 2018  - Euvolemic on exam    - BP elevated at 150's, no known Hx of HTN.   - Can start Lopressor 25 mg q12H    - Monitor electrolytes, replete to keep K>4 and Mag>2  - Will continue to follow    Yuly Pina DNP, NP-C, AGACNP-C  Cardiology  Call TEAMS

## 2025-03-28 NOTE — H&P ADULT - PROBLEM SELECTOR PLAN 1
EKG: Vent Rate: 63 BPM, IL interval: 162 ms, QRS: 78 ms, QT/QTc: 398/407. Impression: Normal Sinus Rhythm  Troponin: 741.3  -s/p aspirin 325 mg in the ED. c/w aspirin 81 mg   -Cardiac Cath: LHC via RRA. oLCx 50%. mRCA 99% treated with successful PCI/KP. LVEDP 20 mmHg.  -Brilinta 90 mg BID, lopressor 12.5 mg BID  -c/w 1L NS 75 mL/hr for 6 hours  -Plan per ICU EKG: Vent Rate: 63 BPM, PA interval: 162 ms, QRS: 78 ms, QT/QTc: 398/407. Impression: Normal Sinus Rhythm  Troponin: 741.3  -s/p aspirin 325 mg in the ED. c/w aspirin 81 mg   -Cardiac Cath (3/28): LHC via RRA. oLCx 50%. mRCA 99% treated with successful PCI/KP. LVEDP 20 mmHg.  -c/w Brilinta 90 mg BID, lopressor 12.5 mg BID  -c/w 1L NS 75 mL/hr for 6 hours  -Plan per ICU

## 2025-03-29 ENCOUNTER — RESULT REVIEW (OUTPATIENT)
Age: 50
End: 2025-03-29

## 2025-03-29 LAB
A1C WITH ESTIMATED AVERAGE GLUCOSE RESULT: 6.1 % — HIGH (ref 4–5.6)
ALBUMIN SERPL ELPH-MCNC: 4.1 G/DL — SIGNIFICANT CHANGE UP (ref 3.3–5)
ALP SERPL-CCNC: 60 U/L — SIGNIFICANT CHANGE UP (ref 40–120)
ALT FLD-CCNC: 30 U/L — SIGNIFICANT CHANGE UP (ref 12–78)
ANION GAP SERPL CALC-SCNC: 5 MMOL/L — SIGNIFICANT CHANGE UP (ref 5–17)
AST SERPL-CCNC: 28 U/L — SIGNIFICANT CHANGE UP (ref 15–37)
BILIRUB DIRECT SERPL-MCNC: 0.2 MG/DL — SIGNIFICANT CHANGE UP (ref 0–0.3)
BILIRUB INDIRECT FLD-MCNC: 0.8 MG/DL — SIGNIFICANT CHANGE UP (ref 0.2–1)
BILIRUB SERPL-MCNC: 1 MG/DL — SIGNIFICANT CHANGE UP (ref 0.2–1.2)
BUN SERPL-MCNC: 10 MG/DL — SIGNIFICANT CHANGE UP (ref 7–23)
CALCIUM SERPL-MCNC: 8.8 MG/DL — SIGNIFICANT CHANGE UP (ref 8.5–10.1)
CHLORIDE SERPL-SCNC: 108 MMOL/L — SIGNIFICANT CHANGE UP (ref 96–108)
CHOLEST SERPL-MCNC: 162 MG/DL — SIGNIFICANT CHANGE UP
CK MB BLD-MCNC: 4.7 % — HIGH (ref 0–3.5)
CK MB CFR SERPL CALC: 9.4 NG/ML — HIGH (ref 0–3.6)
CK SERPL-CCNC: 200 U/L — SIGNIFICANT CHANGE UP (ref 26–308)
CK SERPL-CCNC: 236 U/L — SIGNIFICANT CHANGE UP (ref 26–308)
CO2 SERPL-SCNC: 27 MMOL/L — SIGNIFICANT CHANGE UP (ref 22–31)
CREAT SERPL-MCNC: 1 MG/DL — SIGNIFICANT CHANGE UP (ref 0.5–1.3)
EGFR: 92 ML/MIN/1.73M2 — SIGNIFICANT CHANGE UP
EGFR: 92 ML/MIN/1.73M2 — SIGNIFICANT CHANGE UP
ESTIMATED AVERAGE GLUCOSE: 128 MG/DL — HIGH (ref 68–114)
GLUCOSE SERPL-MCNC: 114 MG/DL — HIGH (ref 70–99)
HCT VFR BLD CALC: 46.6 % — SIGNIFICANT CHANGE UP (ref 39–50)
HDLC SERPL-MCNC: 36 MG/DL — LOW
HGB BLD-MCNC: 16.4 G/DL — SIGNIFICANT CHANGE UP (ref 13–17)
LDLC SERPL-MCNC: 97 MG/DL — SIGNIFICANT CHANGE UP
LIPID PNL WITH DIRECT LDL SERPL: 97 MG/DL — SIGNIFICANT CHANGE UP
MCHC RBC-ENTMCNC: 31.7 PG — SIGNIFICANT CHANGE UP (ref 27–34)
MCHC RBC-ENTMCNC: 35.2 G/DL — SIGNIFICANT CHANGE UP (ref 32–36)
MCV RBC AUTO: 90.1 FL — SIGNIFICANT CHANGE UP (ref 80–100)
NONHDLC SERPL-MCNC: 125 MG/DL — SIGNIFICANT CHANGE UP
NRBC BLD AUTO-RTO: 0 /100 WBCS — SIGNIFICANT CHANGE UP (ref 0–0)
PLATELET # BLD AUTO: 184 K/UL — SIGNIFICANT CHANGE UP (ref 150–400)
POTASSIUM SERPL-MCNC: 3.9 MMOL/L — SIGNIFICANT CHANGE UP (ref 3.5–5.3)
POTASSIUM SERPL-SCNC: 3.9 MMOL/L — SIGNIFICANT CHANGE UP (ref 3.5–5.3)
PROT SERPL-MCNC: 7 G/DL — SIGNIFICANT CHANGE UP (ref 6–8.3)
RBC # BLD: 5.17 M/UL — SIGNIFICANT CHANGE UP (ref 4.2–5.8)
RBC # FLD: 12.3 % — SIGNIFICANT CHANGE UP (ref 10.3–14.5)
SODIUM SERPL-SCNC: 140 MMOL/L — SIGNIFICANT CHANGE UP (ref 135–145)
TRIGL SERPL-MCNC: 162 MG/DL — HIGH
TROPONIN I, HIGH SENSITIVITY RESULT: 1622.2 NG/L — HIGH
TROPONIN I, HIGH SENSITIVITY RESULT: 2114.2 NG/L — HIGH
WBC # BLD: 7.32 K/UL — SIGNIFICANT CHANGE UP (ref 3.8–10.5)
WBC # FLD AUTO: 7.32 K/UL — SIGNIFICANT CHANGE UP (ref 3.8–10.5)

## 2025-03-29 PROCEDURE — 93010 ELECTROCARDIOGRAM REPORT: CPT

## 2025-03-29 PROCEDURE — 93306 TTE W/DOPPLER COMPLETE: CPT | Mod: 26

## 2025-03-29 PROCEDURE — 99233 SBSQ HOSP IP/OBS HIGH 50: CPT | Mod: GC

## 2025-03-29 PROCEDURE — 99233 SBSQ HOSP IP/OBS HIGH 50: CPT

## 2025-03-29 RX ORDER — METOPROLOL SUCCINATE 50 MG/1
1 TABLET, EXTENDED RELEASE ORAL
Qty: 45 | Refills: 0
Start: 2025-03-29 | End: 2025-05-12

## 2025-03-29 RX ORDER — ACETAMINOPHEN 500 MG/5ML
650 LIQUID (ML) ORAL EVERY 6 HOURS
Refills: 0 | Status: DISCONTINUED | OUTPATIENT
Start: 2025-03-29 | End: 2025-03-30

## 2025-03-29 RX ORDER — CLOPIDOGREL BISULFATE 75 MG/1
1 TABLET, FILM COATED ORAL
Qty: 45 | Refills: 0
Start: 2025-03-29 | End: 2025-05-12

## 2025-03-29 RX ORDER — ATORVASTATIN CALCIUM 80 MG/1
1 TABLET, FILM COATED ORAL
Qty: 45 | Refills: 0
Start: 2025-03-29 | End: 2025-05-12

## 2025-03-29 RX ORDER — ASPIRIN 325 MG
1 TABLET ORAL
Refills: 0 | DISCHARGE

## 2025-03-29 RX ORDER — LOSARTAN POTASSIUM 100 MG/1
1 TABLET, FILM COATED ORAL
Qty: 45 | Refills: 0
Start: 2025-03-29 | End: 2025-05-12

## 2025-03-29 RX ORDER — CLOPIDOGREL BISULFATE 75 MG/1
75 TABLET, FILM COATED ORAL EVERY 24 HOURS
Refills: 0 | Status: DISCONTINUED | OUTPATIENT
Start: 2025-03-30 | End: 2025-03-30

## 2025-03-29 RX ORDER — LOSARTAN POTASSIUM 100 MG/1
25 TABLET, FILM COATED ORAL DAILY
Refills: 0 | Status: DISCONTINUED | OUTPATIENT
Start: 2025-03-29 | End: 2025-03-29

## 2025-03-29 RX ORDER — METOPROLOL SUCCINATE 50 MG/1
25 TABLET, EXTENDED RELEASE ORAL DAILY
Refills: 0 | Status: DISCONTINUED | OUTPATIENT
Start: 2025-03-29 | End: 2025-03-30

## 2025-03-29 RX ORDER — ASPIRIN 325 MG
1 TABLET ORAL
Qty: 45 | Refills: 0
Start: 2025-03-29 | End: 2025-05-12

## 2025-03-29 RX ORDER — INFLUENZA A VIRUS A/IDAHO/07/2018 (H1N1) ANTIGEN (MDCK CELL DERIVED, PROPIOLACTONE INACTIVATED, INFLUENZA A VIRUS A/INDIANA/08/2018 (H3N2) ANTIGEN (MDCK CELL DERIVED, PROPIOLACTONE INACTIVATED), INFLUENZA B VIRUS B/SINGAPORE/INFTT-16-0610/2016 ANTIGEN (MDCK CELL DERIVED, PROPIOLACTONE INACTIVATED), INFLUENZA B VIRUS B/IOWA/06/2017 ANTIGEN (MDCK CELL DERIVED, PROPIOLACTONE INACTIVATED) 15; 15; 15; 15 UG/.5ML; UG/.5ML; UG/.5ML; UG/.5ML
0.5 INJECTION, SUSPENSION INTRAMUSCULAR ONCE
Refills: 0 | Status: DISCONTINUED | OUTPATIENT
Start: 2025-03-29 | End: 2025-03-30

## 2025-03-29 RX ORDER — CLOPIDOGREL BISULFATE 75 MG/1
600 TABLET, FILM COATED ORAL ONCE
Refills: 0 | Status: COMPLETED | OUTPATIENT
Start: 2025-03-29 | End: 2025-03-29

## 2025-03-29 RX ADMIN — TICAGRELOR 90 MILLIGRAM(S): 90 TABLET ORAL at 06:08

## 2025-03-29 RX ADMIN — METOPROLOL SUCCINATE 25 MILLIGRAM(S): 50 TABLET, EXTENDED RELEASE ORAL at 17:25

## 2025-03-29 RX ADMIN — Medication 81 MILLIGRAM(S): at 06:08

## 2025-03-29 RX ADMIN — Medication 1 APPLICATION(S): at 06:14

## 2025-03-29 RX ADMIN — CLOPIDOGREL BISULFATE 600 MILLIGRAM(S): 75 TABLET, FILM COATED ORAL at 11:02

## 2025-03-29 RX ADMIN — ATORVASTATIN CALCIUM 80 MILLIGRAM(S): 80 TABLET, FILM COATED ORAL at 21:13

## 2025-03-29 RX ADMIN — Medication 650 MILLIGRAM(S): at 13:29

## 2025-03-29 RX ADMIN — Medication 650 MILLIGRAM(S): at 12:59

## 2025-03-29 RX ADMIN — METOPROLOL SUCCINATE 12.5 MILLIGRAM(S): 50 TABLET, EXTENDED RELEASE ORAL at 06:09

## 2025-03-29 NOTE — PROGRESS NOTE ADULT - ASSESSMENT
50 y/o M with Hx of idiopathic VT s/p ablation (Dr. Fuentes in 2018) and HLD presented to the ED c/o intermittent CP that started 3 days ago.     Chest Pain, Elevated Troponins    - Had been having intermittent CP x 1 week  - Troponin elevated at 741, and found to have NSTEMI  - s/p asa and brilinta  - would change brilinta to plavix given insurance issues (need to reload with Plavix 600, then 75 daily)  - s/p cath with severe RCA disease s/p PCI  - cont high dose statin  - cont bb  - echocardiogram  - trend troponin until peak  - Monitor on tele overnight    - Monitor electrolytes, replete to keep K>4 and Mag>2  - Will continue to follow. Plan for dc in the AM.

## 2025-03-29 NOTE — PROGRESS NOTE ADULT - TIME BILLING
Patient is seen and examined with the Resident, Patient presented with chest pain. s/p LHC and Stent placement in RCA. Now admitted in ICU for further management. Rest of the clinical management as per ICU service. We will follow peripherally. Patient is seen and examined with the Resident, Patient presented with chest pain- NSTEMI  s/p LHC and Stent placement in RCA. Now admitted in ICU for further management.

## 2025-03-29 NOTE — PROGRESS NOTE ADULT - SUBJECTIVE AND OBJECTIVE BOX
[facility] - Cyrus Santos, Robbi Moss, Fritz Lundy  Office Number:  [phone]    Patient resting comfortably in bed in NAD.  Laying flat with no respiratory distress.  No complaints of chest pain, dyspnea, palpitations, PND, or orthopnea.    ROS: negative unless otherwise mentioned.    Telemetry:  sr    MEDICATIONS  (STANDING):  aspirin  chewable 81 milliGRAM(s) Oral every 24 hours  atorvastatin 80 milliGRAM(s) Oral at bedtime  chlorhexidine 2% Cloths 1 Application(s) Topical <User Schedule>  metoprolol succinate ER 25 milliGRAM(s) Oral daily    MEDICATIONS  (PRN):      Allergies    No Known Allergies    Intolerances        Vital Signs Last 24 Hrs  T(C): 36.6 (29 Mar 2025 07:25), Max: 36.7 (28 Mar 2025 17:13)  T(F): 97.9 (29 Mar 2025 07:25), Max: 98.1 (28 Mar 2025 17:13)  HR: 79 (29 Mar 2025 10:00) (63 - 89)  BP: 127/92 (29 Mar 2025 10:00) (112/79 - 159/93)  BP(mean): 104 (29 Mar 2025 10:00) (91 - 110)  RR: 16 (29 Mar 2025 10:00) (11 - 19)  SpO2: 99% (29 Mar 2025 10:00) (95% - 100%)    Parameters below as of 29 Mar 2025 10:00  Patient On (Oxygen Delivery Method): room air        I&O's Summary    28 Mar 2025 07:01  -  29 Mar 2025 07:00  --------------------------------------------------------  IN: 375 mL / OUT: 750 mL / NET: -375 mL    29 Mar 2025 07:01  -  29 Mar 2025 11:13  --------------------------------------------------------  IN: 360 mL / OUT: 0 mL / NET: 360 mL        ON EXAM:    General: NAD, awake and alert, oriented x 3  HEENT: Mucous membranes are moist, anicteric  Lungs: Non-labored, breath sounds are clear bilaterally, No wheezing, rales or rhonchi  Cardiovascular: Regular, S1 and S2, no murmurs, rubs, or gallops  Gastrointestinal: Bowel Sounds present, soft, nontender.   Lymph: No peripheral edema. No lymphadenopathy.  Skin: No rashes or ulcers  Psych:  Mood & affect appropriate    LABS: All Labs Reviewed:                        16.4   7.32  )-----------( 184      ( 29 Mar 2025 06:24 )             46.6                         17.8   6.79  )-----------( 212      ( 28 Mar 2025 16:11 )             50.7     29 Mar 2025 06:24    140    |  108    |  10     ----------------------------<  114    3.9     |  27     |  1.00   28 Mar 2025 16:11    138    |  106    |  10     ----------------------------<  110    4.6     |  28     |  1.00     Ca    8.8        29 Mar 2025 06:24  Ca    9.6        28 Mar 2025 16:11    TPro  7.0    /  Alb  4.1    /  TBili  1.0    /  DBili  0.2    /  AST  28     /  ALT  30     /  AlkPhos  60     29 Mar 2025 06:24  TPro  8.3    /  Alb  4.8    /  TBili  0.8    /  DBili  x      /  AST  21     /  ALT  31     /  AlkPhos  65     28 Mar 2025 16:11    PT/INR - ( 28 Mar 2025 17:18 )   PT: 12.3 sec;   INR: 1.04 ratio         PTT - ( 28 Mar 2025 17:18 )  PTT:31.9 sec  CARDIAC MARKERS ( 29 Mar 2025 06:24 )  x     / x     / x     / x     / 9.4 ng/mL      Blood Culture:

## 2025-03-29 NOTE — PROGRESS NOTE ADULT - ASSESSMENT
50 y/o male w/ PMHx of family hx of CAD, HLD, RVOT VT (s/p ablation in 2018) admitted to the ED with NSTEMI.

## 2025-03-29 NOTE — PROGRESS NOTE ADULT - SUBJECTIVE AND OBJECTIVE BOX
INTERVAL HPI/OVERNIGHT EVENTS: No acute overnight events occurred.    SUBJECTIVE: Seen and examined pt at bedside. Has no acute complaints at this time. Denies chest pain or SOB at this time.    Review of Systems:  Constitutional: No fever, chills, fatigue  Neuro: No headache, numbness, weakness  Resp: No cough, wheezing, shortness of breath  CVS: No chest pain, palpitations, leg swelling  GI: No abdominal pain, nausea, vomiting, diarrhea   : No dysuria, frequency, incontinence  Skin: No itching, burning, rashes, or lesions   Msk: No joint pain or swelling  Psych: No depression, anxiety, mood swings    ICU Vital Signs Last 24 Hrs  T(C): 36.8 (29 Mar 2025 11:37), Max: 36.8 (29 Mar 2025 11:37)  T(F): 98.2 (29 Mar 2025 11:37), Max: 98.2 (29 Mar 2025 11:37)  HR: 79 (29 Mar 2025 10:00) (63 - 89)  BP: 127/92 (29 Mar 2025 10:00) (112/79 - 159/93)  BP(mean): 104 (29 Mar 2025 10:00) (91 - 110)  ABP: --  ABP(mean): --  RR: 16 (29 Mar 2025 10:00) (11 - 19)  SpO2: 99% (29 Mar 2025 10:00) (95% - 100%)    O2 Parameters below as of 29 Mar 2025 10:00  Patient On (Oxygen Delivery Method): room air              03-28-25 @ 07:01  -  03-29-25 @ 07:00  --------------------------------------------------------  IN: 375 mL / OUT: 750 mL / NET: -375 mL    03-29-25 @ 07:01  -  03-29-25 @ 11:48  --------------------------------------------------------  IN: 360 mL / OUT: 0 mL / NET: 360 mL        CAPILLARY BLOOD GLUCOSE          I&O's Summary    28 Mar 2025 07:01  -  29 Mar 2025 07:00  --------------------------------------------------------  IN: 375 mL / OUT: 750 mL / NET: -375 mL    29 Mar 2025 07:01  -  29 Mar 2025 11:48  --------------------------------------------------------  IN: 360 mL / OUT: 0 mL / NET: 360 mL        PHYSICAL EXAM:  General: NAD  Neurology: awake and alert  HEENT: NC/AT  Respiratory: CTA b/l, no rales or rhonchi noted  Cardiovascular: RRR, normal S1S2, no M/R/G  Abdomen: soft, NT/ND, no palpable masses  Extremities: WWP, no clubbing, cyanosis, or edema  Skin: warm/dry      Meds:    metoprolol succinate ER Oral    atorvastatin Oral          aspirin  chewable Oral            chlorhexidine 2% Cloths Topical                              16.4   7.32  )-----------( 184      ( 29 Mar 2025 06:24 )             46.6       03-29    140  |  108  |  10  ----------------------------<  114[H]  3.9   |  27  |  1.00    Ca    8.8      29 Mar 2025 06:24    TPro  7.0  /  Alb  4.1  /  TBili  1.0  /  DBili  0.2  /  AST  28  /  ALT  30  /  AlkPhos  60  03-29      CARDIAC MARKERS ( 29 Mar 2025 06:24 )  x     / x     / x     / x     / 9.4 ng/mL      PT/INR - ( 28 Mar 2025 17:18 )   PT: 12.3 sec;   INR: 1.04 ratio         PTT - ( 28 Mar 2025 17:18 )  PTT:31.9 sec  Urinalysis Basic - ( 29 Mar 2025 06:24 )    Color: x / Appearance: x / SG: x / pH: x  Gluc: 114 mg/dL / Ketone: x  / Bili: x / Urobili: x   Blood: x / Protein: x / Nitrite: x   Leuk Esterase: x / RBC: x / WBC x   Sq Epi: x / Non Sq Epi: x / Bacteria: x          Radiology:      Bedside Ultrasound:    Tubes/Lines:      GLOBAL ISSUE/BEST PRACTICE:  Analgesia:  Sedation:  HOB elevation: Y  Stress ulcer prophylaxis:  VTE prophylaxis:  Glycemic control:  Nutrition:    CODE STATUS:        INTERVAL HPI/OVERNIGHT EVENTS: No acute overnight events occurred.    SUBJECTIVE: Seen and examined pt at bedside. Has no acute complaints at this time. Denies chest pain or SOB at this time.    Review of Systems:  Constitutional: No fever, chills, fatigue  Neuro: No headache, numbness, weakness  Resp: No cough, wheezing, shortness of breath  CVS: No chest pain, palpitations, leg swelling  GI: No abdominal pain, nausea, vomiting, diarrhea   : No dysuria, frequency, incontinence  Skin: No itching, burning, rashes, or lesions   Msk: No joint pain or swelling  Psych: No depression, anxiety, mood swings    ICU Vital Signs Last 24 Hrs  T(C): 36.8 (29 Mar 2025 11:37), Max: 36.8 (29 Mar 2025 11:37)  T(F): 98.2 (29 Mar 2025 11:37), Max: 98.2 (29 Mar 2025 11:37)  HR: 79 (29 Mar 2025 10:00) (63 - 89)  BP: 127/92 (29 Mar 2025 10:00) (112/79 - 159/93)  BP(mean): 104 (29 Mar 2025 10:00) (91 - 110)  ABP: --  ABP(mean): --  RR: 16 (29 Mar 2025 10:00) (11 - 19)  SpO2: 99% (29 Mar 2025 10:00) (95% - 100%)    O2 Parameters below as of 29 Mar 2025 10:00  Patient On (Oxygen Delivery Method): room air              03-28-25 @ 07:01  -  03-29-25 @ 07:00  --------------------------------------------------------  IN: 375 mL / OUT: 750 mL / NET: -375 mL    03-29-25 @ 07:01  -  03-29-25 @ 11:48  --------------------------------------------------------  IN: 360 mL / OUT: 0 mL / NET: 360 mL        CAPILLARY BLOOD GLUCOSE          I&O's Summary    28 Mar 2025 07:01  -  29 Mar 2025 07:00  --------------------------------------------------------  IN: 375 mL / OUT: 750 mL / NET: -375 mL    29 Mar 2025 07:01  -  29 Mar 2025 11:48  --------------------------------------------------------  IN: 360 mL / OUT: 0 mL / NET: 360 mL        PHYSICAL EXAM:  General: NAD  Neurology: awake and alert  HEENT: NC/AT  Respiratory: CTA b/l, no rales or rhonchi noted  Cardiovascular: RRR, normal S1S2, no M/R/G  Abdomen: soft, NT/ND, no palpable masses  Extremities: WWP, no clubbing, cyanosis, or edema  Skin: warm/dry      Meds:    metoprolol succinate ER Oral    atorvastatin Oral          aspirin  chewable Oral            chlorhexidine 2% Cloths Topical                              16.4   7.32  )-----------( 184      ( 29 Mar 2025 06:24 )             46.6       03-29    140  |  108  |  10  ----------------------------<  114[H]  3.9   |  27  |  1.00    Ca    8.8      29 Mar 2025 06:24    TPro  7.0  /  Alb  4.1  /  TBili  1.0  /  DBili  0.2  /  AST  28  /  ALT  30  /  AlkPhos  60  03-29      CARDIAC MARKERS ( 29 Mar 2025 06:24 )  x     / x     / x     / x     / 9.4 ng/mL      PT/INR - ( 28 Mar 2025 17:18 )   PT: 12.3 sec;   INR: 1.04 ratio         PTT - ( 28 Mar 2025 17:18 )  PTT:31.9 sec  Urinalysis Basic - ( 29 Mar 2025 06:24 )    Color: x / Appearance: x / SG: x / pH: x  Gluc: 114 mg/dL / Ketone: x  / Bili: x / Urobili: x   Blood: x / Protein: x / Nitrite: x   Leuk Esterase: x / RBC: x / WBC x   Sq Epi: x / Non Sq Epi: x / Bacteria: x      Tubes/Lines: n/a      GLOBAL ISSUE/BEST PRACTICE:  Analgesia: N  Sedation: N  HOB elevation: Y  Stress ulcer prophylaxis: N  VTE prophylaxis: SCDs  Glycemic control: N  Nutrition: Y    CODE STATUS: full code       INTERVAL HPI/OVERNIGHT EVENTS: No acute overnight events occurred.    SUBJECTIVE: Seen and examined pt at bedside. Has no acute complaints at this time. Denies chest pain or SOB at this time.    Review of Systems:  Constitutional: No fever, chills, fatigue  Neuro: No headache, numbness, weakness  Resp: No cough, wheezing, shortness of breath  CVS: No chest pain, palpitations, leg swelling  GI: No abdominal pain, nausea, vomiting, diarrhea   : No dysuria, frequency, incontinence  Skin: No itching, burning, rashes, or lesions   Msk: No joint pain or swelling  Psych: No depression, anxiety, mood swings    ICU Vital Signs Last 24 Hrs  T(C): 36.8 (29 Mar 2025 11:37), Max: 36.8 (29 Mar 2025 11:37)  T(F): 98.2 (29 Mar 2025 11:37), Max: 98.2 (29 Mar 2025 11:37)  HR: 79 (29 Mar 2025 10:00) (63 - 89)  BP: 127/92 (29 Mar 2025 10:00) (112/79 - 159/93)  BP(mean): 104 (29 Mar 2025 10:00) (91 - 110)  ABP: --  ABP(mean): --  RR: 16 (29 Mar 2025 10:00) (11 - 19)  SpO2: 99% (29 Mar 2025 10:00) (95% - 100%)    O2 Parameters below as of 29 Mar 2025 10:00  Patient On (Oxygen Delivery Method): room air              03-28-25 @ 07:01  -  03-29-25 @ 07:00  --------------------------------------------------------  IN: 375 mL / OUT: 750 mL / NET: -375 mL    03-29-25 @ 07:01  -  03-29-25 @ 11:48  --------------------------------------------------------  IN: 360 mL / OUT: 0 mL / NET: 360 mL        CAPILLARY BLOOD GLUCOSE          I&O's Summary    28 Mar 2025 07:01  -  29 Mar 2025 07:00  --------------------------------------------------------  IN: 375 mL / OUT: 750 mL / NET: -375 mL    29 Mar 2025 07:01  -  29 Mar 2025 11:48  --------------------------------------------------------  IN: 360 mL / OUT: 0 mL / NET: 360 mL        PHYSICAL EXAM:  General: NAD  Neurology: awake and alert  HEENT: NC/AT  Respiratory: CTA b/l, no rales or rhonchi noted  Cardiovascular: RRR, normal S1S2, no M/R/G  Abdomen: soft, NT/ND, no palpable masses  Extremities: WWP, no clubbing, cyanosis, or edema. RRA site c/d/i, no signs of hematoma  Skin: warm/dry      Meds:    metoprolol succinate ER Oral    atorvastatin Oral          aspirin  chewable Oral            chlorhexidine 2% Cloths Topical                              16.4   7.32  )-----------( 184      ( 29 Mar 2025 06:24 )             46.6       03-29    140  |  108  |  10  ----------------------------<  114[H]  3.9   |  27  |  1.00    Ca    8.8      29 Mar 2025 06:24    TPro  7.0  /  Alb  4.1  /  TBili  1.0  /  DBili  0.2  /  AST  28  /  ALT  30  /  AlkPhos  60  03-29      CARDIAC MARKERS ( 29 Mar 2025 06:24 )  x     / x     / x     / x     / 9.4 ng/mL      PT/INR - ( 28 Mar 2025 17:18 )   PT: 12.3 sec;   INR: 1.04 ratio         PTT - ( 28 Mar 2025 17:18 )  PTT:31.9 sec  Urinalysis Basic - ( 29 Mar 2025 06:24 )    Color: x / Appearance: x / SG: x / pH: x  Gluc: 114 mg/dL / Ketone: x  / Bili: x / Urobili: x   Blood: x / Protein: x / Nitrite: x   Leuk Esterase: x / RBC: x / WBC x   Sq Epi: x / Non Sq Epi: x / Bacteria: x      Tubes/Lines: n/a      GLOBAL ISSUE/BEST PRACTICE:  Analgesia: N  Sedation: N  HOB elevation: Y  Stress ulcer prophylaxis: N  VTE prophylaxis: SCDs  Glycemic control: N  Nutrition: Y    CODE STATUS: full code       INTERVAL HPI/OVERNIGHT EVENTS: No acute overnight events occurred.    SUBJECTIVE: Seen and examined pt at bedside. Has no acute complaints at this time. Denies chest pain or SOB at this time.      ICU Vital Signs Last 24 Hrs  T(C): 36.8 (29 Mar 2025 11:37), Max: 36.8 (29 Mar 2025 11:37)  T(F): 98.2 (29 Mar 2025 11:37), Max: 98.2 (29 Mar 2025 11:37)  HR: 79 (29 Mar 2025 10:00) (63 - 89)  BP: 127/92 (29 Mar 2025 10:00) (112/79 - 159/93)  BP(mean): 104 (29 Mar 2025 10:00) (91 - 110)  ABP: --  ABP(mean): --  RR: 16 (29 Mar 2025 10:00) (11 - 19)  SpO2: 99% (29 Mar 2025 10:00) (95% - 100%)    O2 Parameters below as of 29 Mar 2025 10:00  Patient On (Oxygen Delivery Method): room air              03-28-25 @ 07:01  -  03-29-25 @ 07:00  --------------------------------------------------------  IN: 375 mL / OUT: 750 mL / NET: -375 mL    03-29-25 @ 07:01  -  03-29-25 @ 11:48  --------------------------------------------------------  IN: 360 mL / OUT: 0 mL / NET: 360 mL        CAPILLARY BLOOD GLUCOSE          I&O's Summary    28 Mar 2025 07:01  -  29 Mar 2025 07:00  --------------------------------------------------------  IN: 375 mL / OUT: 750 mL / NET: -375 mL    29 Mar 2025 07:01  -  29 Mar 2025 11:48  --------------------------------------------------------  IN: 360 mL / OUT: 0 mL / NET: 360 mL        PHYSICAL EXAM:  General: NAD  Neurology: awake and alert  HEENT: NC/AT  Respiratory: CTA b/l, no rales or rhonchi noted  Cardiovascular: RRR, normal S1S2, no M/R/G  Abdomen: soft, NT/ND, no palpable masses  Extremities: WWP, no clubbing, cyanosis, or edema. RRA site c/d/i, no signs of hematoma  Skin: warm/dry      Meds:    metoprolol succinate ER Oral    atorvastatin Oral          aspirin  chewable Oral            chlorhexidine 2% Cloths Topical                              16.4   7.32  )-----------( 184      ( 29 Mar 2025 06:24 )             46.6       03-29    140  |  108  |  10  ----------------------------<  114[H]  3.9   |  27  |  1.00    Ca    8.8      29 Mar 2025 06:24    TPro  7.0  /  Alb  4.1  /  TBili  1.0  /  DBili  0.2  /  AST  28  /  ALT  30  /  AlkPhos  60  03-29      CARDIAC MARKERS ( 29 Mar 2025 06:24 )  x     / x     / x     / x     / 9.4 ng/mL      PT/INR - ( 28 Mar 2025 17:18 )   PT: 12.3 sec;   INR: 1.04 ratio         PTT - ( 28 Mar 2025 17:18 )  PTT:31.9 sec  Urinalysis Basic - ( 29 Mar 2025 06:24 )    Color: x / Appearance: x / SG: x / pH: x  Gluc: 114 mg/dL / Ketone: x  / Bili: x / Urobili: x   Blood: x / Protein: x / Nitrite: x   Leuk Esterase: x / RBC: x / WBC x   Sq Epi: x / Non Sq Epi: x / Bacteria: x    < from: Xray Chest 2 Views PA/Lat (03.28.25 @ 15:58) >  IMPRESSION:  No radiographic evidence of active chest disease..    --- End of Report ---    < end of copied text >  < from: TTE W or WO Ultrasound Enhancing Agent (03.29.25 @ 10:07) >  CONCLUSIONS:      1. Left ventricular cavity is normal in size. Left ventricular wall thickness is normal. Left ventricular systolic function is normal with an ejection fraction visually estimated at 55 to 60 %.   2. Basal and mid inferior wall and basal inferoseptal segment are abnormal.   3. Normal right ventricular cavity size, with normal wall thickness, and normal right ventricular systolic function.   4. Tricuspid aortic valve with normal leaflet excursion.   5. Trace mitral regurgitation.    < end of copied text >  < from: Cardiac Catheterization (03.28.25 @ 17:56) >  Conclusions:   Critical RCA stenosis with thrombus- the culprit for his ACS. EF 45%.  Moderate ostial LCx disease. Mild distal LM and LAD    disease. --s/p 1 stent to the mRCA.     Recommendations:     Aspirin and Brilinta for at least 12 months.    Acute complication:    No complications     < end of copied text >      DATA REVIEW  All data personally reviewed.  See above for details    Laboratory and Micro results--normal WBC, stable Hb and platelets, normal Cr and lytes, trop increasing  Radiology results--normal CXR  Cardiology results-- EF 55-60 with WMA      Tubes/Lines: none      GLOBAL ISSUE/BEST PRACTICE:  Analgesia: N  Sedation: N  HOB elevation: Y  Stress ulcer prophylaxis: N  VTE prophylaxis: SCDs  Glycemic control: N  Nutrition: Y    CODE STATUS: full code

## 2025-03-29 NOTE — PROGRESS NOTE ADULT - SUBJECTIVE AND OBJECTIVE BOX
Patient is a 49y old  Male who presents with a chief complaint of NSTEMI (28 Mar 2025 20:24)      INTERVAL HPI/OVERNIGHT EVENTS:     T(C): 36.6 (03-29-25 @ 07:25), Max: 36.7 (03-28-25 @ 17:13)  HR: 72 (03-29-25 @ 09:00) (63 - 89)  BP: 133/100 (03-29-25 @ 09:00) (112/79 - 159/93)  RR: 17 (03-29-25 @ 09:00) (11 - 19)  SpO2: 97% (03-29-25 @ 09:00) (95% - 100%)  Wt(kg): --  I&O's Summary    28 Mar 2025 07:01  -  29 Mar 2025 07:00  --------------------------------------------------------  IN: 375 mL / OUT: 750 mL / NET: -375 mL        REVIEW OF SYSTEMS:  CONSTITUTIONAL: No fever, weight loss, or fatigue  EYES: No eye pain, visual disturbances, or discharge  ENMT:  No difficulty hearing, tinnitus, vertigo; No sinus or throat pain  NECK: No pain or stiffness  BREASTS: No pain, no masses  RESPIRATORY: No cough, wheezing, chills or hemoptysis; No shortness of breath  CARDIOVASCULAR: No chest pain, palpitations, dizziness, or leg swelling  GASTROINTESTINAL: No abdominal or epigastric pain. No nausea, vomiting, or hematemesis; No diarrhea or constipation. No melena or hematochezia.  GENITOURINARY: No dysuria, frequency, hematuria, or incontinence  NEUROLOGICAL: No headaches, memory loss, loss of strength, numbness, or tremors  SKIN: No itching, burning, rashes, or lesions   MUSCULOSKELETAL: No joint pain or swelling; No muscle, back, or extremity pain    PHYSICAL EXAM:  GENERAL: NAD, well-groomed, well-developed  HEAD:  Atraumatic, Normocephalic  EYES: EOMI, PERRLA, conjunctiva and sclera clear  ENMT: No tonsillar erythema, exudates, or enlargement; Moist mucous membranes  NECK: Supple, No JVD  NERVOUS SYSTEM:  Alert & Oriented X3; Motor Strength 5/5 B/L upper and lower extremities; DTRs 2+ intact and symmetric  CHEST/LUNG: Clear to percussion bilaterally; No rales, rhonchi, wheezing, or rubs  HEART: Regular rate and rhythm; No murmurs, rubs, or gallops  ABDOMEN: Soft, Nontender, Nondistended; Bowel sounds present  EXTREMITIES:  2+ Peripheral Pulses, No clubbing, cyanosis, or edema  SKIN: No rashes or lesions    MEDICATIONS  (STANDING):  aspirin  chewable 81 milliGRAM(s) Oral every 24 hours  atorvastatin 80 milliGRAM(s) Oral at bedtime  chlorhexidine 2% Cloths 1 Application(s) Topical <User Schedule>  metoprolol tartrate 12.5 milliGRAM(s) Oral every 12 hours  sodium chloride 0.9% Bolus 250 milliLiter(s) IV Bolus once  sodium chloride 0.9%. 1000 milliLiter(s) (75 mL/Hr) IV Continuous <Continuous>  sodium chloride 0.9%. 1000 milliLiter(s) (75 mL/Hr) IV Continuous <Continuous>  ticagrelor 90 milliGRAM(s) Oral every 12 hours    MEDICATIONS  (PRN):      LABS:                        16.4   7.32  )-----------( 184      ( 29 Mar 2025 06:24 )             46.6     03-29    140  |  108  |  10  ----------------------------<  114[H]  3.9   |  27  |  1.00    Ca    8.8      29 Mar 2025 06:24    TPro  7.0  /  Alb  4.1  /  TBili  1.0  /  DBili  0.2  /  AST  28  /  ALT  30  /  AlkPhos  60  03-29    PT/INR - ( 28 Mar 2025 17:18 )   PT: 12.3 sec;   INR: 1.04 ratio         PTT - ( 28 Mar 2025 17:18 )  PTT:31.9 sec  Urinalysis Basic - ( 29 Mar 2025 06:24 )    Color: x / Appearance: x / SG: x / pH: x  Gluc: 114 mg/dL / Ketone: x  / Bili: x / Urobili: x   Blood: x / Protein: x / Nitrite: x   Leuk Esterase: x / RBC: x / WBC x   Sq Epi: x / Non Sq Epi: x / Bacteria: x      CAPILLARY BLOOD GLUCOSE                  RADIOLOGY & ADDITIONAL TESTS:    Imaging Personally Reviewed:       Advance Directives:      Palliative Care:  Appropriate     Patient is a 49y old  Male who presents with a chief complaint of NSTEMI (28 Mar 2025 20:24)  pt seen and examine today awake feeling better denies any cp , sob .    INTERVAL HPI/OVERNIGHT EVENTS:     T(C): 36.6 (03-29-25 @ 07:25), Max: 36.7 (03-28-25 @ 17:13)  HR: 72 (03-29-25 @ 09:00) (63 - 89)  BP: 133/100 (03-29-25 @ 09:00) (112/79 - 159/93)  RR: 17 (03-29-25 @ 09:00) (11 - 19)  SpO2: 97% (03-29-25 @ 09:00) (95% - 100%)  Wt(kg): --  I&O's Summary    28 Mar 2025 07:01  -  29 Mar 2025 07:00  --------------------------------------------------------  IN: 375 mL / OUT: 750 mL / NET: -375 mL        REVIEW OF SYSTEMS:  CONSTITUTIONAL: No fever, weight loss, or fatigue  EYES: No eye pain, visual disturbances, or discharge  ENMT:  No difficulty hearing, tinnitus, vertigo; No sinus or throat pain  NECK: No pain or stiffness  RESPIRATORY: No cough, wheezing, chills or hemoptysis; No shortness of breath  CARDIOVASCULAR: No chest pain, palpitations, dizziness, or leg swelling  GASTROINTESTINAL: No abdominal or epigastric pain. No nausea, vomiting, or hematemesis; No diarrhea or constipation  GENITOURINARY: No dysuria, frequency, hematuria, or incontinence  NEUROLOGICAL: No headaches, memory loss, loss of strength, numbness, or tremors  SKIN: No itching, burning, rashes, or lesions   MUSCULOSKELETAL: No joint pain or swelling; No muscle, back, or extremity pain    PHYSICAL EXAM:  GENERAL: NAD,   HEAD:  Atraumatic, Normocephalic  EYES: EOMI, PERRLA, conjunctiva and sclera clear  ENMT: No tonsillar erythema, exudates, or enlargement; Moist mucous membranes  NECK: Supple, No JVD  NERVOUS SYSTEM:  Alert & Oriented X3; Motor Strength 5/5 B/L upper and lower extremities; DTRs 2+ intact and symmetric  CHEST/LUNG:   percussion bilaterally; No rales, rhonchi, wheezing,   HEART: Regular rate and rhythm; No murmurs,   ABDOMEN: Soft, Nontender, Nondistended; Bowel sounds present  EXTREMITIES:  2+ Peripheral Pulses, No clubbing, cyanosis, or edema  SKIN: No rashes or lesions    MEDICATIONS  (STANDING):  aspirin  chewable 81 milliGRAM(s) Oral every 24 hours  atorvastatin 80 milliGRAM(s) Oral at bedtime  chlorhexidine 2% Cloths 1 Application(s) Topical <User Schedule>  metoprolol tartrate 12.5 milliGRAM(s) Oral every 12 hours  sodium chloride 0.9% Bolus 250 milliLiter(s) IV Bolus once  sodium chloride 0.9%. 1000 milliLiter(s) (75 mL/Hr) IV Continuous <Continuous>  sodium chloride 0.9%. 1000 milliLiter(s) (75 mL/Hr) IV Continuous <Continuous>  ticagrelor 90 milliGRAM(s) Oral every 12 hours    MEDICATIONS  (PRN):      LABS:                        16.4   7.32  )-----------( 184      ( 29 Mar 2025 06:24 )             46.6     03-29    140  |  108  |  10  ----------------------------<  114[H]  3.9   |  27  |  1.00    Ca    8.8      29 Mar 2025 06:24    TPro  7.0  /  Alb  4.1  /  TBili  1.0  /  DBili  0.2  /  AST  28  /  ALT  30  /  AlkPhos  60  03-29    PT/INR - ( 28 Mar 2025 17:18 )   PT: 12.3 sec;   INR: 1.04 ratio         PTT - ( 28 Mar 2025 17:18 )  PTT:31.9 sec  Urinalysis Basic - ( 29 Mar 2025 06:24 )    Color: x / Appearance: x / SG: x / pH: x  Gluc: 114 mg/dL / Ketone: x  / Bili: x / Urobili: x   Blood: x / Protein: x / Nitrite: x   Leuk Esterase: x / RBC: x / WBC x   Sq Epi: x / Non Sq Epi: x / Bacteria: x                        RADIOLOGY & ADDITIONAL TESTS:    Imaging Personally Reviewed:   NO NEW TEST

## 2025-03-29 NOTE — PROGRESS NOTE ADULT - PROBLEM SELECTOR PLAN 1
EKG: Vent Rate: 63 BPM, [place] interval: 162 ms, QRS: 78 ms, QT/QTc: 398/407. Impression: Normal Sinus Rhythm  Troponin: 741.3  -s/p aspirin 325 mg in the ED. c/w aspirin 81 mg   -Cardiac Cath (3/28): LHC via RRA. oLCx 50%. mRCA 99% treated with successful PCI/KP. LVEDP 20 mmHg.  -c/w Brilinta 90 mg BID, lopressor 12.5 mg BID  -c/w 1L NS 75 mL/hr for 6 hours  -Plan per ICU EKG: Vent Rate: 63 BPM, SD interval: 162 ms, QRS: 78 ms, QT/QTc: 398/407. Impression: Normal Sinus Rhythm  Troponin: 741.3  -s/p aspirin 325 mg in the ED. c/w aspirin 81 mg   -Cardiac Cath (3/28): LHC via RRA. oLCx 50%. mRCA 99% treated with successful PCI/KP. LVEDP 20 mmHg.  - post   Brilinta 90 mg BID  will start plavix  by cath team , lopressor 25 MG PO DAILY.     -Plan per ICU

## 2025-03-29 NOTE — PROGRESS NOTE ADULT - ASSESSMENT
50 y/o male w/ PMHx of family hx of CAD, HLD, RVOT VT (s/p ablation in 2018) admitted to the ED with NSTEMI. Received PCI with KP to the mRCA, s/p atropine due to hypotension and bradycardia during cath, transferred to the ICU for further monitoring.     Neuro:  - no acute issues    CV:  - NSTEMI s/p PCI with KP to mRCA with 99% occlusion  - s/p atropine due to hypotension and bradycardia during cath  - RRA site c/d/i, no hematoma  - c/w ASA and statin  - Will plavix load per interventional  - Transition to toprol 25mg qhs per interventional cardiology  - f/u TTE  - f/u lipid profile  - f/u 12pm trop    Pulm:  no acute issues    GI:  - DASH diet    Renal:  - no acute issues  - monitor Cr s/p PCI    ID:  - no acute issues    Endo:  - no acute issues  - f/u A1c    Heme:  - SCDs for DVT ppx s/p PCI

## 2025-03-30 VITALS — TEMPERATURE: 99 F

## 2025-03-30 LAB
ALBUMIN SERPL ELPH-MCNC: 4.2 G/DL — SIGNIFICANT CHANGE UP (ref 3.3–5)
ALP SERPL-CCNC: 62 U/L — SIGNIFICANT CHANGE UP (ref 40–120)
ALT FLD-CCNC: 26 U/L — SIGNIFICANT CHANGE UP (ref 12–78)
ANION GAP SERPL CALC-SCNC: 8 MMOL/L — SIGNIFICANT CHANGE UP (ref 5–17)
AST SERPL-CCNC: 29 U/L — SIGNIFICANT CHANGE UP (ref 15–37)
BILIRUB SERPL-MCNC: 1.7 MG/DL — HIGH (ref 0.2–1.2)
BUN SERPL-MCNC: 13 MG/DL — SIGNIFICANT CHANGE UP (ref 7–23)
CALCIUM SERPL-MCNC: 8.9 MG/DL — SIGNIFICANT CHANGE UP (ref 8.5–10.1)
CHLORIDE SERPL-SCNC: 105 MMOL/L — SIGNIFICANT CHANGE UP (ref 96–108)
CK MB BLD-MCNC: 4.1 % — HIGH (ref 0–3.5)
CK MB CFR SERPL CALC: 8 NG/ML — HIGH (ref 0–3.6)
CK SERPL-CCNC: 195 U/L — SIGNIFICANT CHANGE UP (ref 26–308)
CO2 SERPL-SCNC: 23 MMOL/L — SIGNIFICANT CHANGE UP (ref 22–31)
CREAT SERPL-MCNC: 0.92 MG/DL — SIGNIFICANT CHANGE UP (ref 0.5–1.3)
EGFR: 101 ML/MIN/1.73M2 — SIGNIFICANT CHANGE UP
EGFR: 101 ML/MIN/1.73M2 — SIGNIFICANT CHANGE UP
GLUCOSE SERPL-MCNC: 102 MG/DL — HIGH (ref 70–99)
HCT VFR BLD CALC: 48.7 % — SIGNIFICANT CHANGE UP (ref 39–50)
HGB BLD-MCNC: 17.1 G/DL — HIGH (ref 13–17)
MAGNESIUM SERPL-MCNC: 2.2 MG/DL — SIGNIFICANT CHANGE UP (ref 1.6–2.6)
MCHC RBC-ENTMCNC: 31.5 PG — SIGNIFICANT CHANGE UP (ref 27–34)
MCHC RBC-ENTMCNC: 35.1 G/DL — SIGNIFICANT CHANGE UP (ref 32–36)
MCV RBC AUTO: 89.9 FL — SIGNIFICANT CHANGE UP (ref 80–100)
NRBC BLD AUTO-RTO: 0 /100 WBCS — SIGNIFICANT CHANGE UP (ref 0–0)
PHOSPHATE SERPL-MCNC: 3.2 MG/DL — SIGNIFICANT CHANGE UP (ref 2.5–4.5)
PLATELET # BLD AUTO: 194 K/UL — SIGNIFICANT CHANGE UP (ref 150–400)
POTASSIUM SERPL-MCNC: 3.8 MMOL/L — SIGNIFICANT CHANGE UP (ref 3.5–5.3)
POTASSIUM SERPL-SCNC: 3.8 MMOL/L — SIGNIFICANT CHANGE UP (ref 3.5–5.3)
PROT SERPL-MCNC: 7.4 G/DL — SIGNIFICANT CHANGE UP (ref 6–8.3)
RBC # BLD: 5.42 M/UL — SIGNIFICANT CHANGE UP (ref 4.2–5.8)
RBC # FLD: 12.1 % — SIGNIFICANT CHANGE UP (ref 10.3–14.5)
SODIUM SERPL-SCNC: 136 MMOL/L — SIGNIFICANT CHANGE UP (ref 135–145)
TROPONIN I, HIGH SENSITIVITY RESULT: 2076.5 NG/L — HIGH
TROPONIN I, HIGH SENSITIVITY RESULT: 3560.3 NG/L — HIGH
WBC # BLD: 7.64 K/UL — SIGNIFICANT CHANGE UP (ref 3.8–10.5)
WBC # FLD AUTO: 7.64 K/UL — SIGNIFICANT CHANGE UP (ref 3.8–10.5)

## 2025-03-30 PROCEDURE — 80061 LIPID PANEL: CPT

## 2025-03-30 PROCEDURE — 84484 ASSAY OF TROPONIN QUANT: CPT

## 2025-03-30 PROCEDURE — 85025 COMPLETE CBC W/AUTO DIFF WBC: CPT

## 2025-03-30 PROCEDURE — 36415 COLL VENOUS BLD VENIPUNCTURE: CPT

## 2025-03-30 PROCEDURE — C1887: CPT

## 2025-03-30 PROCEDURE — 92978 ENDOLUMINL IVUS OCT C 1ST: CPT | Mod: RC

## 2025-03-30 PROCEDURE — C1894: CPT

## 2025-03-30 PROCEDURE — 93010 ELECTROCARDIOGRAM REPORT: CPT

## 2025-03-30 PROCEDURE — 80076 HEPATIC FUNCTION PANEL: CPT

## 2025-03-30 PROCEDURE — 85730 THROMBOPLASTIN TIME PARTIAL: CPT

## 2025-03-30 PROCEDURE — 71046 X-RAY EXAM CHEST 2 VIEWS: CPT

## 2025-03-30 PROCEDURE — 80048 BASIC METABOLIC PNL TOTAL CA: CPT

## 2025-03-30 PROCEDURE — 93005 ELECTROCARDIOGRAM TRACING: CPT

## 2025-03-30 PROCEDURE — 85610 PROTHROMBIN TIME: CPT

## 2025-03-30 PROCEDURE — 99232 SBSQ HOSP IP/OBS MODERATE 35: CPT | Mod: GC

## 2025-03-30 PROCEDURE — 99233 SBSQ HOSP IP/OBS HIGH 50: CPT | Mod: GC

## 2025-03-30 PROCEDURE — 82553 CREATINE MB FRACTION: CPT

## 2025-03-30 PROCEDURE — 93458 L HRT ARTERY/VENTRICLE ANGIO: CPT | Mod: 59

## 2025-03-30 PROCEDURE — 83735 ASSAY OF MAGNESIUM: CPT

## 2025-03-30 PROCEDURE — 99233 SBSQ HOSP IP/OBS HIGH 50: CPT

## 2025-03-30 PROCEDURE — C1753: CPT

## 2025-03-30 PROCEDURE — 96374 THER/PROPH/DIAG INJ IV PUSH: CPT

## 2025-03-30 PROCEDURE — 84100 ASSAY OF PHOSPHORUS: CPT

## 2025-03-30 PROCEDURE — C1874: CPT

## 2025-03-30 PROCEDURE — 83036 HEMOGLOBIN GLYCOSYLATED A1C: CPT

## 2025-03-30 PROCEDURE — 82550 ASSAY OF CK (CPK): CPT

## 2025-03-30 PROCEDURE — C1725: CPT

## 2025-03-30 PROCEDURE — 99285 EMERGENCY DEPT VISIT HI MDM: CPT

## 2025-03-30 PROCEDURE — C9600: CPT | Mod: RC

## 2025-03-30 PROCEDURE — C1769: CPT

## 2025-03-30 PROCEDURE — 80053 COMPREHEN METABOLIC PANEL: CPT

## 2025-03-30 PROCEDURE — 93306 TTE W/DOPPLER COMPLETE: CPT

## 2025-03-30 PROCEDURE — 85027 COMPLETE CBC AUTOMATED: CPT

## 2025-03-30 RX ADMIN — CLOPIDOGREL BISULFATE 75 MILLIGRAM(S): 75 TABLET, FILM COATED ORAL at 06:04

## 2025-03-30 RX ADMIN — Medication 81 MILLIGRAM(S): at 06:04

## 2025-03-30 NOTE — DIETITIAN INITIAL EVALUATION ADULT - PERSON TAUGHT/METHOD
Written/verbal Heart healthy, consistent carbohydrate nutrition therapy provided with good understanding of material discussed. RDs name/phone number left with patient if questions/concerns arise.

## 2025-03-30 NOTE — DIETITIAN INITIAL EVALUATION ADULT - PERTINENT LABORATORY DATA
03-30    136  |  105  |  13  ----------------------------<  102[H]  3.8   |  23  |  0.92    Ca    8.9      30 Mar 2025 06:15  Phos  3.2     03-30  Mg     2.2     03-30    TPro  7.4  /  Alb  4.2  /  TBili  1.7[H]  /  DBili  x   /  AST  29  /  ALT  26  /  AlkPhos  62  03-30  A1C with Estimated Average Glucose Result: 6.1 % (03-29-25 @ 06:24)

## 2025-03-30 NOTE — DIETITIAN INITIAL EVALUATION ADULT - PROBLEM SELECTOR PROBLEM 2
Hyperlipidemia
Plan: Discussed other options like oral pills. Discussed doxycycline , patient declined
Detail Level: Zone
Continue Regimen: Tretinoin 0.50% cream pea size amount to face every other night, increase to nightly as tolerated\\nMetronidazole 0.75% cream to face qam\\nBpo wash to face 1x daily

## 2025-03-30 NOTE — DISCHARGE NOTE NURSING/CASE MANAGEMENT/SOCIAL WORK - FINANCIAL ASSISTANCE
[Normal] : Oriented to person, place, and time, insight and judgement were intact and the affect was normal [de-identified] : Left small finger: \par Skin intact, moderate swelling, no ecchymosis, no gross deformity. \par active flexion/extension intact DIP/PIP/MCP\par no instability\par NVI distally\par ROM limited 2/2 stiffness, fingertip to palm distance 3 cm [de-identified] : x-rays from outside imaging source or reviewed there is no fracture or dislocation [facility] provides services at a reduced cost to those who are determined to be eligible through [facility]’s financial assistance program. Information regarding [facility]’s financial assistance program can be found by going to [email or web address] or by calling [phone].

## 2025-03-30 NOTE — DIETITIAN INITIAL EVALUATION ADULT - CALCULATED FROM (ML/KG)
1937 Tissue Cultured Epidermal Autograft Text: The defect edges were debeveled with a #15 scalpel blade.  Given the location of the defect, shape of the defect and the proximity to free margins a tissue cultured epidermal autograft was deemed most appropriate.  The graft was then trimmed to fit the size of the defect.  The graft was then placed in the primary defect and oriented appropriately.

## 2025-03-30 NOTE — DIETITIAN INITIAL EVALUATION ADULT - PROBLEM SELECTOR PLAN 1
EKG: Vent Rate: 63 BPM, AK interval: 162 ms, QRS: 78 ms, QT/QTc: 398/407. Impression: Normal Sinus Rhythm  Troponin: 741.3  -s/p aspirin 325 mg in the ED. c/w aspirin 81 mg   -Cardiac Cath (3/28): LHC via RRA. oLCx 50%. mRCA 99% treated with successful PCI/KP. LVEDP 20 mmHg.  -c/w Brilinta 90 mg BID, lopressor 12.5 mg BID  -c/w 1L NS 75 mL/hr for 6 hours  -Plan per ICU

## 2025-03-30 NOTE — PROGRESS NOTE ADULT - ASSESSMENT
48 y/o M with Hx of idiopathic VT s/p ablation (Dr. Fuentes in 2018) and HLD presented to the ED c/o intermittent CP that started 3 days ago.     Chest Pain, Elevated Troponins    - Had been having intermittent CP x 1 week  - Troponin elevated at 741, and found to have NSTEMI  - cont dapt  - brilinta changed to plavix given insurance issues (reloaded with Plavix 600, then 75 daily)  - s/p cath with severe RCA disease s/p PCI  - cont high dose statin  - cont bb. He did have brief nsvt/ectopy on 3/29, though nothing over 24 hours.  - echocardiogram with overall normal EF with inferior salas hypokinesis  - troponin has trended down  - Monitor electrolytes, replete to keep K>4 and Mag>2  - plan for dc home today with close outpt fu

## 2025-03-30 NOTE — PROGRESS NOTE ADULT - SUBJECTIVE AND OBJECTIVE BOX
[facility] - Cyrus Santos, Robbi Moss Savella, [place]  Office Number:  [phone]    Patient resting comfortably in bed in NAD.  Laying flat with no respiratory distress.  No complaints of chest pain, dyspnea, palpitations, PND, or orthopnea.    ROS: negative unless otherwise mentioned.    Telemetry:  sr    MEDICATIONS  (STANDING):  aspirin  chewable 81 milliGRAM(s) Oral every 24 hours  atorvastatin 80 milliGRAM(s) Oral at bedtime  chlorhexidine 2% Cloths 1 Application(s) Topical <User Schedule>  clopidogrel Tablet 75 milliGRAM(s) Oral every 24 hours  influenza   Vaccine 0.5 milliLiter(s) IntraMuscular once  metoprolol succinate ER 25 milliGRAM(s) Oral daily    MEDICATIONS  (PRN):  acetaminophen     Tablet .. 650 milliGRAM(s) Oral every 6 hours PRN Mild Pain (1 - 3)      Allergies    No Known Allergies    Intolerances        Vital Signs Last 24 Hrs  T(C): 36.9 (30 Mar 2025 08:54), Max: 36.9 (29 Mar 2025 23:37)  T(F): 98.4 (30 Mar 2025 08:54), Max: 98.4 (29 Mar 2025 23:37)  HR: 86 (30 Mar 2025 10:00) (66 - 90)  BP: 109/74 (30 Mar 2025 10:00) (96/69 - 142/95)  BP(mean): 85 (30 Mar 2025 10:00) (78 - 118)  RR: 17 (30 Mar 2025 09:00) (14 - 17)  SpO2: 94% (30 Mar 2025 10:00) (94% - 100%)    Parameters below as of 29 Mar 2025 17:00  Patient On (Oxygen Delivery Method): room air        I&O's Summary    29 Mar 2025 07:01  -  30 Mar 2025 07:00  --------------------------------------------------------  IN: 960 mL / OUT: 0 mL / NET: 960 mL        ON EXAM:    General: NAD, awake and alert, oriented x 3  HEENT: Mucous membranes are moist, anicteric  Lungs: Non-labored, breath sounds are clear bilaterally, No wheezing, rales or rhonchi  Cardiovascular: Regular, S1 and S2, no murmurs, rubs, or gallops  Gastrointestinal: Bowel Sounds present, soft, nontender.   Lymph: No peripheral edema. No lymphadenopathy.  Skin: No rashes or ulcers  Psych:  Mood & affect appropriate    LABS: All Labs Reviewed:                        17.1   7.64  )-----------( 194      ( 30 Mar 2025 06:15 )             48.7                         16.4   7.32  )-----------( 184      ( 29 Mar 2025 06:24 )             46.6                         17.8   6.79  )-----------( 212      ( 28 Mar 2025 16:11 )             50.7     30 Mar 2025 06:15    136    |  105    |  13     ----------------------------<  102    3.8     |  23     |  0.92   29 Mar 2025 06:24    140    |  108    |  10     ----------------------------<  114    3.9     |  27     |  1.00   28 Mar 2025 16:11    138    |  106    |  10     ----------------------------<  110    4.6     |  28     |  1.00     Ca    8.9        30 Mar 2025 06:15  Ca    8.8        29 Mar 2025 06:24  Ca    9.6        28 Mar 2025 16:11  Phos  3.2       30 Mar 2025 06:15  Mg     2.2       30 Mar 2025 06:15    TPro  7.4    /  Alb  4.2    /  TBili  1.7    /  DBili  x      /  AST  29     /  ALT  26     /  AlkPhos  62     30 Mar 2025 06:15  TPro  7.0    /  Alb  4.1    /  TBili  1.0    /  DBili  0.2    /  AST  28     /  ALT  30     /  AlkPhos  60     29 Mar 2025 06:24  TPro  8.3    /  Alb  4.8    /  TBili  0.8    /  DBili  x      /  AST  21     /  ALT  31     /  AlkPhos  65     28 Mar 2025 16:11    PT/INR - ( 28 Mar 2025 17:18 )   PT: 12.3 sec;   INR: 1.04 ratio         PTT - ( 28 Mar 2025 17:18 )  PTT:31.9 sec  CARDIAC MARKERS ( 30 Mar 2025 00:07 )  x     / x     / x     / x     / 8.0 ng/mL  CARDIAC MARKERS ( 29 Mar 2025 06:24 )  x     / x     / x     / x     / 9.4 ng/mL      Blood Culture:

## 2025-03-30 NOTE — PHARMACOTHERAPY INTERVENTION NOTE - COMMENTS
Meds to beds requested by provider.    The following prescriptions were filled at [facility]: toprol, plavix, ecotrin, lipitor      Medications delivered by pharmacist at bedside and counseled on indication, directions, and side effects.  Patient verbalized understanding and had no further questions

## 2025-03-30 NOTE — DIETITIAN INITIAL EVALUATION ADULT - PERTINENT MEDS FT
MEDICATIONS  (STANDING):  aspirin  chewable 81 milliGRAM(s) Oral every 24 hours  atorvastatin 80 milliGRAM(s) Oral at bedtime  chlorhexidine 2% Cloths 1 Application(s) Topical <User Schedule>  clopidogrel Tablet 75 milliGRAM(s) Oral every 24 hours  influenza   Vaccine 0.5 milliLiter(s) IntraMuscular once  metoprolol succinate ER 25 milliGRAM(s) Oral daily    MEDICATIONS  (PRN):  acetaminophen     Tablet .. 650 milliGRAM(s) Oral every 6 hours PRN Mild Pain (1 - 3)

## 2025-03-30 NOTE — PROGRESS NOTE ADULT - SUBJECTIVE AND OBJECTIVE BOX
Patient is a 49y old  Male who presents with a chief complaint of NSTEMI (30 Mar 2025 11:23)    pt seen and examine today awake , denies any c/o , no fever .   INTERVAL HPI/OVERNIGHT EVENTS:     T(C): 37.1 (03-30-25 @ 12:11), Max: 37.1 (03-30-25 @ 12:11)  HR: 93 (03-30-25 @ 12:00) (66 - 93)  BP: 126/92 (03-30-25 @ 12:00) (96/69 - 142/95)  RR: 17 (03-30-25 @ 11:00) (14 - 17)  SpO2: 94% (03-30-25 @ 12:00) (94% - 100%)  Wt(kg): --  I&O's Summary    29 Mar 2025 07:01  -  30 Mar 2025 07:00  --------------------------------------------------------  IN: 960 mL / OUT: 0 mL / NET: 960 mL        REVIEW OF SYSTEMS:  CONSTITUTIONAL: No fever, weight loss, or fatigue  EYES: No eye pain, visual disturbances, or discharge  ENMT:  No difficulty hearing, tinnitus, vertigo; No sinus or throat pain  NECK: No pain or stiffness  RESPIRATORY: No cough, wheezing, chills or hemoptysis; No shortness of breath  CARDIOVASCULAR: No chest pain, palpitations, dizziness, or leg swelling  GASTROINTESTINAL: No abdominal or epigastric pain. No nausea, vomiting, or hematemesis; No diarrhea or constipation.   GENITOURINARY: No dysuria, frequency, hematuria, or incontinence  NEUROLOGICAL: No headaches, memory loss, loss of strength, numbness, or tremors  SKIN: No itching, burning, rashes, or lesions   MUSCULOSKELETAL: No joint pain or swelling; No muscle, back, or extremity pain    PHYSICAL EXAM:  GENERAL: NAD,   HEAD:  Atraumatic, Normocephalic  EYES: EOMI, PERRLA, conjunctiva and sclera clear  ENMT: Moist mucous membranes  NECK: Supple,    NERVOUS SYSTEM:  Alert & Oriented X3; Motor Strength 5/5 B/L upper and lower extremities; DTRs 2+ intact and symmetric  CHEST/LUNG: Clear to percussion bilaterally; No rales, rhonchi, wheezing,   HEART: Regular rate and rhythm; No murmurs,   ABDOMEN: Soft, Nontender, Nondistended; Bowel sounds present  EXTREMITIES:  2+ Peripheral Pulses, No clubbing, cyanosis, or edema  SKIN: No rashes or lesions    MEDICATIONS  (STANDING):  aspirin  chewable 81 milliGRAM(s) Oral every 24 hours  atorvastatin 80 milliGRAM(s) Oral at bedtime  chlorhexidine 2% Cloths 1 Application(s) Topical <User Schedule>  clopidogrel Tablet 75 milliGRAM(s) Oral every 24 hours  influenza   Vaccine 0.5 milliLiter(s) IntraMuscular once  metoprolol succinate ER 25 milliGRAM(s) Oral daily    MEDICATIONS  (PRN):  acetaminophen     Tablet .. 650 milliGRAM(s) Oral every 6 hours PRN Mild Pain (1 - 3)      LABS:                        17.1   7.64  )-----------( 194      ( 30 Mar 2025 06:15 )             48.7     03-30    136  |  105  |  13  ----------------------------<  102[H]  3.8   |  23  |  0.92    Ca    8.9      30 Mar 2025 06:15  Phos  3.2     03-30  Mg     2.2     03-30    TPro  7.4  /  Alb  4.2  /  TBili  1.7[H]  /  DBili  x   /  AST  29  /  ALT  26  /  AlkPhos  62  03-30    PT/INR - ( 28 Mar 2025 17:18 )   PT: 12.3 sec;   INR: 1.04 ratio         PTT - ( 28 Mar 2025 17:18 )  PTT:31.9 sec  Urinalysis Basic - ( 30 Mar 2025 06:15 )    Color: x / Appearance: x / SG: x / pH: x  Gluc: 102 mg/dL / Ketone: x  / Bili: x / Urobili: x   Blood: x / Protein: x / Nitrite: x   Leuk Esterase: x / RBC: x / WBC x   Sq Epi: x / Non Sq Epi: x / Bacteria: x                      RADIOLOGY & ADDITIONAL TESTS:    Imaging Personally Reviewed:     no test

## 2025-03-30 NOTE — PROGRESS NOTE ADULT - PROBLEM SELECTOR PLAN 1
EKG: Vent Rate: 63 BPM, DC interval: 162 ms, QRS: 78 ms, QT/QTc: 398/407. Impression: Normal Sinus Rhythm  Troponin: 741.3  -s/p aspirin 325 mg in the ED. c/w aspirin 81 mg   -Cardiac Cath (3/28): LHC via RRA. oLCx 50%. mRCA 99% treated with successful PCI/KP. LVEDP 20 mmHg.  -  Brilinta 90 mg BID  -  started  plavix , lopressor 25 MG PO DAILY.     -Plan per ICU - dc plan .

## 2025-03-30 NOTE — PROGRESS NOTE ADULT - ATTENDING COMMENTS
50 yo man with Hx RVOT VT s/p ablation 2018, presenting with NSTEMI, s/p LHC with KP to mRCA, now doing well.    --mentating well  --CAD, NSTEMI  continue plavix and ASA  continue toprol 25 daily  continue statin  BP not adequate for ARB/ACEI  appears euvolemic  --stable respiratory status  --normal renal function  --PO diet  --no infectious concerns  --plan discussed with pt and fiance  --plan discussed with Dr. Lundy  --stable for d/c home
50 yo man with Hx RVOT VT s/p ablation 2018, presenting with NSTEMI, s/p LHC with KP to mRCA, now doing well.    --mentating well  --CAD, NSTEMI  transition to plavix load and maintenance for insurance reasons, continue ASA  change lopressor to toprol 25 daily  continue statin  possible ARB if BP stable after toprol  echo noted with normal EF  appears euvolemic  --stable respiratory status  --normal renal function  --PO diet  --no infectious concerns  --plan discussed with pt  --plan discussed with Dr. Lundy, interventional cards team

## 2025-03-30 NOTE — DIETITIAN INITIAL EVALUATION ADULT - NUTRITIONGOAL OUTCOME1
Pt to comply with rx therapeutic diet for optimal cardiac health/lipid profile & reduction in Hgba1c

## 2025-03-30 NOTE — DIETITIAN INITIAL EVALUATION ADULT - OTHER INFO
48 y/o male w/ PMHx of family hx of CAD, HLD, RVOT VT (s/p ablation in 2018) admitted to the ED with NSTEMI. [facility]/p atropine due to hypotension and bradycardia during cath, transferred to the ICU for further monitoring.     Pt A+Ox4 at visit. Dash/TLC diet rx. Pt tolerating well. No report difficulty chewing/swallowing. GI wdl. Hgba1c 6.1%(indicative pre-diabetes category). Elevated . Results discussed with pt. Recommend changing diet to Consistent Carbohydrate, Dash/TLC. Verbal/written heart healthy, consistent carbohydrate nutrition therapy provided.    48 y/o male w/ PMHx of family hx of CAD, HLD, RVOT VT (s/p ablation in 2018) admitted to the ED with NSTEMI. Received PCI with KP to the mRCA, s/p atropine due to hypotension and bradycardia during cath, transferred to the ICU for further monitoring.     Pt A+Ox4 at visit. Dash/TLC diet rx. Pt tolerating well with good appetite/po intake. No report difficulty chewing/swallowing. GI wdl. BM pta. Encourage po fluids/dietary fiber for bowel regularity. Pta reports no theraeptuic diet pta. Consumes 2 main meals/day. Breakfast- coffee only. Lunch typically [employer]i (chicken & rice dish) or fast food (cheeseburgers). Dinner typically meat/rice or pasta. Pt does admit to recent increase in added sugars in diet (consuming more regular soda, sugar in coffee, and snacking more particularly oreos). Hgba1c 6.1%(indicative pre-diabetes category). Elevated , Low HDL 36. Results discussed with pt. UBW 170lbs. Stable. Recommend changing diet to Consistent Carbohydrate, Dash/TLC. Verbal/written heart healthy, consistent carbohydrate nutrition therapy provided. Pt with good understanding of material discussed. RDs name/phone number left with patient if questions/concerns arise. Encourage regular physical activity when medically cleared.

## 2025-03-30 NOTE — PROGRESS NOTE ADULT - ASSESSMENT
48 y/o male w/ PMHx of family hx of CAD, HLD, RVOT VT (s/p ablation in 2018) admitted to the ED with NSTEMI. Received PCI with KP to the mRCA, s/p atropine due to hypotension and bradycardia during cath, transferred to the ICU for further monitoring.     Neuro:  - no acute issues    CV:  - NSTEMI s/p PCI with KP to mRCA with 99% occlusion  - s/p atropine due to hypotension and bradycardia during cath  - RRA site c/d/i, no hematoma  - Troponins downtrended   - c/w ASA and statin  - Will plavix load per interventional  - Transition to toprol 25mg qhs per interventional cardiology  - TTE: EF 55-60 with WMA  - HDL low, elevated triglycerides      Pulm:  no acute issues    GI:  - DASH diet    Renal:  - no acute issues  - monitor Cr s/p PCI    ID:  - no acute issues    Endo:  - no acute issues  - f/u A1c    Heme:  - SCDs for DVT ppx s/p PCI   50 y/o male w/ PMHx of family hx of CAD, HLD, RVOT VT (s/p ablation in 2018) admitted to the ED with NSTEMI. Received PCI with KP to the mRCA, s/p atropine due to hypotension and bradycardia during cath, transferred to the ICU for further monitoring.     Neuro:  - no acute issues    CV:  - NSTEMI s/p PCI with KP to mRCA with 99% occlusion  - s/p atropine due to hypotension and bradycardia during cath  - RRA site c/d/i, no hematoma  - Troponins downtrended   - c/w ASA and statin  - Will plavix load per interventional  - Transition to toprol 25mg qhs per interventional cardiology  - TTE: EF 55-60 with WMA  - HDL low, elevated triglycerides      Pulm:  - no acute issues    GI:  - DASH diet    Renal:  - no acute issues  - monitor Cr s/p PCI    ID:  - no acute issues    Endo:  - no acute issues  - A1c: 6.1    Heme:  - SCDs for DVT ppx s/p PCI

## 2025-03-30 NOTE — DIETITIAN INITIAL EVALUATION ADULT - NS FNS DIET ORDER
Diet, DASH/TLC:   Sodium & Cholesterol Restricted     Special Instructions for Nursing:  Sodium & Cholesterol Restricted (03-28-25 @ 18:58) [Active]

## 2025-03-30 NOTE — PROGRESS NOTE ADULT - SUBJECTIVE AND OBJECTIVE BOX
INTERVAL HPI/OVERNIGHT EVENTS: No acute overnight events occurred.    SUBJECTIVE: Seen and examined pt at bedside. Has no acute complaints at this time. Denies chest pain, palpitations, SOB, headache, changes in vision dizziness, n/v at this time.      ICU Vital Signs Last 24 Hrs  T(C): 36.8 (29 Mar 2025 11:37), Max: 36.8 (29 Mar 2025 11:37)  T(F): 98.2 (29 Mar 2025 11:37), Max: 98.2 (29 Mar 2025 11:37)  HR: 79 (29 Mar 2025 10:00) (63 - 89)  BP: 127/92 (29 Mar 2025 10:00) (112/79 - 159/93)  BP(mean): 104 (29 Mar 2025 10:00) (91 - 110)  ABP: --  ABP(mean): --  RR: 16 (29 Mar 2025 10:00) (11 - 19)  SpO2: 99% (29 Mar 2025 10:00) (95% - 100%)    O2 Parameters below as of 29 Mar 2025 10:00  Patient On (Oxygen Delivery Method): room air        03-28-25 @ 07:01  -  03-29-25 @ 07:00  --------------------------------------------------------  IN: 375 mL / OUT: 750 mL / NET: -375 mL    03-29-25 @ 07:01  -  03-29-25 @ 11:48  --------------------------------------------------------  IN: 360 mL / OUT: 0 mL / NET: 360 mL        CAPILLARY BLOOD GLUCOSE          I&O's Summary    28 Mar 2025 07:01  -  29 Mar 2025 07:00  --------------------------------------------------------  IN: 375 mL / OUT: 750 mL / NET: -375 mL    29 Mar 2025 07:01  -  29 Mar 2025 11:48  --------------------------------------------------------  IN: 360 mL / OUT: 0 mL / NET: 360 mL        PHYSICAL EXAM:  General: NAD  Neurology: awake and alert  HEENT: NC/AT  Respiratory: CTA b/l, no rales or rhonchi noted  Cardiovascular: RRR, normal S1S2, no M/R/G  Abdomen: soft, NT/ND, no palpable masses  Extremities: WWP, no clubbing, cyanosis, or edema. RRA site c/d/i, no signs of hematoma  Skin: warm/dry      Meds:    metoprolol succinate ER Oral    atorvastatin Oral          aspirin  chewable Oral            chlorhexidine 2% Cloths Topical                              16.4   7.32  )-----------( 184      ( 29 Mar 2025 06:24 )             46.6       03-29    140  |  108  |  10  ----------------------------<  114[H]  3.9   |  27  |  1.00    Ca    8.8      29 Mar 2025 06:24    TPro  7.0  /  Alb  4.1  /  TBili  1.0  /  DBili  0.2  /  AST  28  /  ALT  30  /  AlkPhos  60  03-29      CARDIAC MARKERS ( 29 Mar 2025 06:24 )  x     / x     / x     / x     / 9.4 ng/mL      PT/INR - ( 28 Mar 2025 17:18 )   PT: 12.3 sec;   INR: 1.04 ratio         PTT - ( 28 Mar 2025 17:18 )  PTT:31.9 sec  Urinalysis Basic - ( 29 Mar 2025 06:24 )    Color: x / Appearance: x / SG: x / pH: x  Gluc: 114 mg/dL / Ketone: x  / Bili: x / Urobili: x   Blood: x / Protein: x / Nitrite: x   Leuk Esterase: x / RBC: x / WBC x   Sq Epi: x / Non Sq Epi: x / Bacteria: x    < from: Xray Chest 2 Views PA/Lat (03.28.25 @ 15:58) >  IMPRESSION:  No radiographic evidence of active chest disease..    --- End of Report ---    < end of copied text >  < from: TTE W or WO Ultrasound Enhancing Agent (03.29.25 @ 10:07) >  CONCLUSIONS:      1. Left ventricular cavity is normal in size. Left ventricular wall thickness is normal. Left ventricular systolic function is normal with an ejection fraction visually estimated at 55 to 60 %.   2. Basal and mid inferior wall and basal inferoseptal segment are abnormal.   3. Normal right ventricular cavity size, with normal wall thickness, and normal right ventricular systolic function.   4. Tricuspid aortic valve with normal leaflet excursion.   5. Trace mitral regurgitation.    < end of copied text >  < from: Cardiac Catheterization (03.28.25 @ 17:56) >  Conclusions:   Critical RCA stenosis with thrombus- the culprit for his ACS. EF 45%.  Moderate ostial LCx disease. Mild distal LM and LAD    disease. --s/p 1 stent to the mRCA.     Recommendations:     Aspirin and Brilinta for at least 12 months.    Acute complication:    No complications     < end of copied text >      DATA REVIEW  All data personally reviewed.  See above for details    Laboratory and Micro results--normal WBC, stable Hb and platelets, normal Cr and lytes, trop downtrending, HDL low, elevated triglycerides  Radiology results--normal CXR  Cardiology results-- EF 55-60 with WMA      Tubes/Lines: none      GLOBAL ISSUE/BEST PRACTICE:  Analgesia: N  Sedation: N  HOB elevation: Y  Stress ulcer prophylaxis: N  VTE prophylaxis: SCDs  Glycemic control: N  Nutrition: Y    CODE STATUS: full code

## 2025-03-30 NOTE — PROGRESS NOTE ADULT - TIME BILLING
Patient is seen and examined with the Resident, Patient presented with chest pain- NSTEMI  s/p LHC and Stent placement in RCA asa / plavix  statin - dc plan .

## 2025-03-30 NOTE — DIETITIAN INITIAL EVALUATION ADULT - HEIGHT FOR BMI (CENTIMETERS)
172.72 Opzelura Counseling:  I discussed with the patient the risks of Opzelura including but not limited to nasopharngitis, bronchitis, ear infection, eosinophila, hives, diarrhea, folliculitis, tonsillitis, and rhinorrhea.  Taken orally, this medication has been linked to serious infections; higher rate of mortality; malignancy and lymphoproliferative disorders; major adverse cardiovascular events; thrombosis; thrombocytopenia, anemia, and neutropenia; and lipid elevations.

## 2025-03-30 NOTE — DISCHARGE NOTE NURSING/CASE MANAGEMENT/SOCIAL WORK - PATIENT PORTAL LINK FT
You can access the FollowMyHealth Patient Portal offered by [facility] by registering at the following website: [email or web address]. By joining [employer]’s FollowMyHealth portal, you will also be able to view your health information using other applications (apps) compatible with our system.

## 2025-04-11 ENCOUNTER — APPOINTMENT (OUTPATIENT)
Dept: CARDIOLOGY | Facility: CLINIC | Age: 50
End: 2025-04-11
Payer: SELF-PAY

## 2025-04-11 ENCOUNTER — NON-APPOINTMENT (OUTPATIENT)
Age: 50
End: 2025-04-11

## 2025-04-11 VITALS
HEIGHT: 68 IN | DIASTOLIC BLOOD PRESSURE: 76 MMHG | SYSTOLIC BLOOD PRESSURE: 112 MMHG | HEART RATE: 79 BPM | BODY MASS INDEX: 25.01 KG/M2 | OXYGEN SATURATION: 99 % | WEIGHT: 165 LBS

## 2025-04-11 DIAGNOSIS — I25.10 ATHEROSCLEROTIC HEART DISEASE OF NATIVE CORONARY ARTERY W/OUT ANGINA PECTORIS: ICD-10-CM

## 2025-04-11 DIAGNOSIS — E78.5 HYPERLIPIDEMIA, UNSPECIFIED: ICD-10-CM

## 2025-04-11 PROCEDURE — 93000 ELECTROCARDIOGRAM COMPLETE: CPT

## 2025-04-11 PROCEDURE — 99214 OFFICE O/P EST MOD 30 MIN: CPT

## 2025-04-11 RX ORDER — ATORVASTATIN CALCIUM 80 MG/1
80 TABLET, FILM COATED ORAL
Refills: 0 | Status: ACTIVE | COMMUNITY

## 2025-04-11 RX ORDER — ASPIRIN 81 MG
81 TABLET, DELAYED RELEASE (ENTERIC COATED) ORAL
Refills: 0 | Status: ACTIVE | COMMUNITY

## 2025-04-11 RX ORDER — METOPROLOL SUCCINATE 25 MG/1
25 TABLET, EXTENDED RELEASE ORAL
Refills: 0 | Status: ACTIVE | COMMUNITY

## 2025-04-11 RX ORDER — CLOPIDOGREL BISULFATE 75 MG/1
75 TABLET, FILM COATED ORAL
Refills: 0 | Status: ACTIVE | COMMUNITY

## 2025-04-12 PROBLEM — I25.10 CAD IN NATIVE ARTERY: Status: ACTIVE | Noted: 2025-04-12

## 2025-04-28 RX ORDER — LOSARTAN POTASSIUM 100 MG/1
1 TABLET, FILM COATED ORAL
Qty: 90 | Refills: 3
Start: 2025-04-28 | End: 2026-04-22

## 2025-04-28 RX ORDER — METOPROLOL SUCCINATE 50 MG/1
1 TABLET, EXTENDED RELEASE ORAL
Qty: 90 | Refills: 3
Start: 2025-04-28 | End: 2026-04-22

## 2025-04-28 RX ORDER — ASPIRIN 325 MG
1 TABLET ORAL
Qty: 90 | Refills: 3
Start: 2025-04-28 | End: 2026-04-22

## 2025-04-28 RX ORDER — ATORVASTATIN CALCIUM 80 MG/1
1 TABLET, FILM COATED ORAL
Qty: 90 | Refills: 3
Start: 2025-04-28 | End: 2026-04-22

## 2025-04-28 RX ORDER — CLOPIDOGREL BISULFATE 75 MG/1
1 TABLET, FILM COATED ORAL
Qty: 90 | Refills: 3
Start: 2025-04-28 | End: 2026-04-22

## 2025-06-11 ENCOUNTER — EMERGENCY (EMERGENCY)
Facility: HOSPITAL | Age: 50
LOS: 1 days | End: 2025-06-11
Attending: STUDENT IN AN ORGANIZED HEALTH CARE EDUCATION/TRAINING PROGRAM | Admitting: STUDENT IN AN ORGANIZED HEALTH CARE EDUCATION/TRAINING PROGRAM
Payer: COMMERCIAL

## 2025-06-11 VITALS
TEMPERATURE: 98 F | OXYGEN SATURATION: 100 % | HEART RATE: 59 BPM | SYSTOLIC BLOOD PRESSURE: 135 MMHG | RESPIRATION RATE: 18 BRPM | DIASTOLIC BLOOD PRESSURE: 83 MMHG | HEIGHT: 68 IN | WEIGHT: 164.91 LBS

## 2025-06-11 VITALS
HEART RATE: 54 BPM | SYSTOLIC BLOOD PRESSURE: 109 MMHG | RESPIRATION RATE: 18 BRPM | DIASTOLIC BLOOD PRESSURE: 75 MMHG | TEMPERATURE: 98 F | OXYGEN SATURATION: 99 %

## 2025-06-11 DIAGNOSIS — Z90.49 ACQUIRED ABSENCE OF OTHER SPECIFIED PARTS OF DIGESTIVE TRACT: Chronic | ICD-10-CM

## 2025-06-11 DIAGNOSIS — Z98.890 OTHER SPECIFIED POSTPROCEDURAL STATES: Chronic | ICD-10-CM

## 2025-06-11 LAB
ALBUMIN SERPL ELPH-MCNC: 4.7 G/DL — SIGNIFICANT CHANGE UP (ref 3.3–5)
ALP SERPL-CCNC: 79 U/L — SIGNIFICANT CHANGE UP (ref 40–120)
ALT FLD-CCNC: 44 U/L — SIGNIFICANT CHANGE UP (ref 12–78)
ANION GAP SERPL CALC-SCNC: 5 MMOL/L — SIGNIFICANT CHANGE UP (ref 5–17)
APTT BLD: 33.2 SEC — SIGNIFICANT CHANGE UP (ref 26.1–36.8)
AST SERPL-CCNC: 20 U/L — SIGNIFICANT CHANGE UP (ref 15–37)
BASOPHILS # BLD AUTO: 0.03 K/UL — SIGNIFICANT CHANGE UP (ref 0–0.2)
BASOPHILS NFR BLD AUTO: 0.5 % — SIGNIFICANT CHANGE UP (ref 0–2)
BILIRUB SERPL-MCNC: 1.2 MG/DL — SIGNIFICANT CHANGE UP (ref 0.2–1.2)
BUN SERPL-MCNC: 16 MG/DL — SIGNIFICANT CHANGE UP (ref 7–23)
CALCIUM SERPL-MCNC: 9.6 MG/DL — SIGNIFICANT CHANGE UP (ref 8.5–10.1)
CHLORIDE SERPL-SCNC: 107 MMOL/L — SIGNIFICANT CHANGE UP (ref 96–108)
CO2 SERPL-SCNC: 27 MMOL/L — SIGNIFICANT CHANGE UP (ref 22–31)
CREAT SERPL-MCNC: 0.94 MG/DL — SIGNIFICANT CHANGE UP (ref 0.5–1.3)
EGFR: 98 ML/MIN/1.73M2 — SIGNIFICANT CHANGE UP
EGFR: 98 ML/MIN/1.73M2 — SIGNIFICANT CHANGE UP
EOSINOPHIL # BLD AUTO: 0.06 K/UL — SIGNIFICANT CHANGE UP (ref 0–0.5)
EOSINOPHIL NFR BLD AUTO: 0.9 % — SIGNIFICANT CHANGE UP (ref 0–6)
GLUCOSE SERPL-MCNC: 94 MG/DL — SIGNIFICANT CHANGE UP (ref 70–99)
HCT VFR BLD CALC: 48.4 % — SIGNIFICANT CHANGE UP (ref 39–50)
HGB BLD-MCNC: 16.7 G/DL — SIGNIFICANT CHANGE UP (ref 13–17)
IMM GRANULOCYTES NFR BLD AUTO: 0.2 % — SIGNIFICANT CHANGE UP (ref 0–0.9)
INR BLD: 0.99 RATIO — SIGNIFICANT CHANGE UP (ref 0.85–1.16)
LYMPHOCYTES # BLD AUTO: 1.92 K/UL — SIGNIFICANT CHANGE UP (ref 1–3.3)
LYMPHOCYTES # BLD AUTO: 29.7 % — SIGNIFICANT CHANGE UP (ref 13–44)
MAGNESIUM SERPL-MCNC: 2.1 MG/DL — SIGNIFICANT CHANGE UP (ref 1.6–2.6)
MCHC RBC-ENTMCNC: 31.7 PG — SIGNIFICANT CHANGE UP (ref 27–34)
MCHC RBC-ENTMCNC: 34.5 G/DL — SIGNIFICANT CHANGE UP (ref 32–36)
MCV RBC AUTO: 92 FL — SIGNIFICANT CHANGE UP (ref 80–100)
MONOCYTES # BLD AUTO: 0.47 K/UL — SIGNIFICANT CHANGE UP (ref 0–0.9)
MONOCYTES NFR BLD AUTO: 7.3 % — SIGNIFICANT CHANGE UP (ref 2–14)
NEUTROPHILS # BLD AUTO: 3.98 K/UL — SIGNIFICANT CHANGE UP (ref 1.8–7.4)
NEUTROPHILS NFR BLD AUTO: 61.4 % — SIGNIFICANT CHANGE UP (ref 43–77)
NRBC BLD AUTO-RTO: 0 /100 WBCS — SIGNIFICANT CHANGE UP (ref 0–0)
PLATELET # BLD AUTO: 190 K/UL — SIGNIFICANT CHANGE UP (ref 150–400)
POTASSIUM SERPL-MCNC: 3.9 MMOL/L — SIGNIFICANT CHANGE UP (ref 3.5–5.3)
POTASSIUM SERPL-SCNC: 3.9 MMOL/L — SIGNIFICANT CHANGE UP (ref 3.5–5.3)
PROT SERPL-MCNC: 8 G/DL — SIGNIFICANT CHANGE UP (ref 6–8.3)
PROTHROM AB SERPL-ACNC: 11.7 SEC — SIGNIFICANT CHANGE UP (ref 9.9–13.4)
RBC # BLD: 5.26 M/UL — SIGNIFICANT CHANGE UP (ref 4.2–5.8)
RBC # FLD: 12.7 % — SIGNIFICANT CHANGE UP (ref 10.3–14.5)
SODIUM SERPL-SCNC: 139 MMOL/L — SIGNIFICANT CHANGE UP (ref 135–145)
TROPONIN I, HIGH SENSITIVITY RESULT: 4.6 NG/L — SIGNIFICANT CHANGE UP
TROPONIN I, HIGH SENSITIVITY RESULT: 5 NG/L — SIGNIFICANT CHANGE UP
WBC # BLD: 6.47 K/UL — SIGNIFICANT CHANGE UP (ref 3.8–10.5)
WBC # FLD AUTO: 6.47 K/UL — SIGNIFICANT CHANGE UP (ref 3.8–10.5)

## 2025-06-11 PROCEDURE — 71045 X-RAY EXAM CHEST 1 VIEW: CPT | Mod: 26

## 2025-06-11 PROCEDURE — 71045 X-RAY EXAM CHEST 1 VIEW: CPT

## 2025-06-11 PROCEDURE — 80053 COMPREHEN METABOLIC PANEL: CPT

## 2025-06-11 PROCEDURE — 85610 PROTHROMBIN TIME: CPT

## 2025-06-11 PROCEDURE — 99285 EMERGENCY DEPT VISIT HI MDM: CPT

## 2025-06-11 PROCEDURE — 93005 ELECTROCARDIOGRAM TRACING: CPT

## 2025-06-11 PROCEDURE — 93010 ELECTROCARDIOGRAM REPORT: CPT | Mod: 76,59

## 2025-06-11 PROCEDURE — 99285 EMERGENCY DEPT VISIT HI MDM: CPT | Mod: 25

## 2025-06-11 PROCEDURE — 83735 ASSAY OF MAGNESIUM: CPT

## 2025-06-11 PROCEDURE — 85730 THROMBOPLASTIN TIME PARTIAL: CPT

## 2025-06-11 PROCEDURE — 85025 COMPLETE CBC W/AUTO DIFF WBC: CPT

## 2025-06-11 PROCEDURE — 36415 COLL VENOUS BLD VENIPUNCTURE: CPT

## 2025-06-11 PROCEDURE — 84484 ASSAY OF TROPONIN QUANT: CPT
